# Patient Record
Sex: FEMALE | Race: WHITE | NOT HISPANIC OR LATINO | Employment: OTHER | URBAN - METROPOLITAN AREA
[De-identification: names, ages, dates, MRNs, and addresses within clinical notes are randomized per-mention and may not be internally consistent; named-entity substitution may affect disease eponyms.]

---

## 2023-10-24 ENCOUNTER — TELEPHONE (OUTPATIENT)
Dept: PRIMARY CARE | Facility: CLINIC | Age: 58
End: 2023-10-24
Payer: MEDICAID

## 2023-10-24 DIAGNOSIS — E11.9 TYPE 2 DIABETES MELLITUS WITHOUT COMPLICATION, WITHOUT LONG-TERM CURRENT USE OF INSULIN (MULTI): Primary | ICD-10-CM

## 2023-10-24 NOTE — TELEPHONE ENCOUNTER
Patient would like to know if you can give her a call regarding her Trulicity, she has a few questions.

## 2023-10-28 DIAGNOSIS — R12 HEARTBURN: Primary | ICD-10-CM

## 2023-10-28 RX ORDER — OMEPRAZOLE 20 MG/1
20 CAPSULE, DELAYED RELEASE ORAL DAILY
Qty: 30 CAPSULE | Refills: 1 | Status: SHIPPED | OUTPATIENT
Start: 2023-10-28 | End: 2024-02-26

## 2023-10-28 RX ORDER — OMEPRAZOLE 20 MG/1
20 CAPSULE, DELAYED RELEASE ORAL DAILY
COMMUNITY
Start: 2023-08-31 | End: 2023-10-28 | Stop reason: SDUPTHER

## 2023-11-21 ENCOUNTER — OFFICE VISIT (OUTPATIENT)
Dept: PRIMARY CARE | Facility: CLINIC | Age: 58
End: 2023-11-21
Payer: MEDICAID

## 2023-11-21 VITALS
BODY MASS INDEX: 32.53 KG/M2 | OXYGEN SATURATION: 96 % | TEMPERATURE: 97.1 F | HEART RATE: 80 BPM | SYSTOLIC BLOOD PRESSURE: 106 MMHG | DIASTOLIC BLOOD PRESSURE: 75 MMHG | WEIGHT: 189.5 LBS

## 2023-11-21 DIAGNOSIS — E03.9 HYPOTHYROIDISM, UNSPECIFIED TYPE: ICD-10-CM

## 2023-11-21 DIAGNOSIS — E78.2 MIXED HYPERLIPIDEMIA: ICD-10-CM

## 2023-11-21 DIAGNOSIS — E11.9 TYPE 2 DIABETES MELLITUS WITHOUT COMPLICATION, WITHOUT LONG-TERM CURRENT USE OF INSULIN (MULTI): Primary | ICD-10-CM

## 2023-11-21 PROBLEM — E78.5 HYPERLIPIDEMIA: Status: ACTIVE | Noted: 2023-11-21

## 2023-11-21 PROBLEM — I11.9 HYPERTENSIVE CARDIOVASCULAR DISEASE: Status: ACTIVE | Noted: 2023-11-21

## 2023-11-21 LAB — POC HEMOGLOBIN A1C: 7.5 % (ref 4.2–6.5)

## 2023-11-21 PROCEDURE — 83036 HEMOGLOBIN GLYCOSYLATED A1C: CPT | Performed by: INTERNAL MEDICINE

## 2023-11-21 PROCEDURE — 4010F ACE/ARB THERAPY RXD/TAKEN: CPT | Performed by: INTERNAL MEDICINE

## 2023-11-21 PROCEDURE — 1036F TOBACCO NON-USER: CPT | Performed by: INTERNAL MEDICINE

## 2023-11-21 PROCEDURE — 3074F SYST BP LT 130 MM HG: CPT | Performed by: INTERNAL MEDICINE

## 2023-11-21 PROCEDURE — 3008F BODY MASS INDEX DOCD: CPT | Performed by: INTERNAL MEDICINE

## 2023-11-21 PROCEDURE — 3078F DIAST BP <80 MM HG: CPT | Performed by: INTERNAL MEDICINE

## 2023-11-21 PROCEDURE — 99213 OFFICE O/P EST LOW 20 MIN: CPT | Performed by: INTERNAL MEDICINE

## 2023-11-21 RX ORDER — LEVOTHYROXINE SODIUM 100 UG/1
1 TABLET ORAL DAILY
COMMUNITY
Start: 2020-07-01

## 2023-11-21 RX ORDER — METFORMIN HYDROCHLORIDE 500 MG/1
500 TABLET, EXTENDED RELEASE ORAL 2 TIMES DAILY
COMMUNITY
End: 2024-02-20 | Stop reason: SDUPTHER

## 2023-11-21 RX ORDER — ROSUVASTATIN CALCIUM 40 MG/1
40 TABLET, COATED ORAL DAILY
COMMUNITY

## 2023-11-21 RX ORDER — LANCETS 30 GAUGE
EACH MISCELLANEOUS
COMMUNITY
Start: 2023-04-27 | End: 2024-06-06 | Stop reason: CLARIF

## 2023-11-21 RX ORDER — FENOFIBRATE 145 MG/1
145 TABLET, FILM COATED ORAL DAILY
COMMUNITY

## 2023-11-21 RX ORDER — METOPROLOL SUCCINATE 25 MG/1
25 TABLET, EXTENDED RELEASE ORAL DAILY
COMMUNITY

## 2023-11-21 RX ORDER — KETOROLAC TROMETHAMINE 10 MG/1
TABLET, FILM COATED ORAL
COMMUNITY
End: 2023-11-21 | Stop reason: ALTCHOICE

## 2023-11-21 RX ORDER — CLOTRIMAZOLE AND BETAMETHASONE DIPROPIONATE 10; .64 MG/G; MG/G
CREAM TOPICAL 2 TIMES DAILY
COMMUNITY
Start: 2011-11-16

## 2023-11-21 RX ORDER — LANCETS 33 GAUGE
EACH MISCELLANEOUS
COMMUNITY
Start: 2023-04-21 | End: 2024-01-24

## 2023-11-21 RX ORDER — LOSARTAN POTASSIUM 50 MG/1
50 TABLET ORAL DAILY
COMMUNITY

## 2023-11-21 RX ORDER — BLOOD-GLUCOSE METER
EACH MISCELLANEOUS
COMMUNITY
End: 2024-06-05 | Stop reason: SDUPTHER

## 2023-11-21 RX ORDER — TRIAMCINOLONE ACETONIDE 1 MG/G
CREAM TOPICAL 2 TIMES DAILY
COMMUNITY

## 2023-11-21 ASSESSMENT — PATIENT HEALTH QUESTIONNAIRE - PHQ9
1. LITTLE INTEREST OR PLEASURE IN DOING THINGS: NOT AT ALL
SUM OF ALL RESPONSES TO PHQ9 QUESTIONS 1 AND 2: 0
2. FEELING DOWN, DEPRESSED OR HOPELESS: NOT AT ALL

## 2023-11-21 NOTE — PROGRESS NOTES
Subjective   Patient ID: Binta Mccann is a 57 y.o. female who presents for Follow-up.    HPI   Recently had cool sculpting done and has complication and probably has to go for liposuction    Patient is compliant with medications. Denies hypoglycemic symptoms. Denies side effects from medications. Watching sugar and carbohydrate intake and salt intake. Denies visual disturbance, numbness or tingling. Denies excess thirst or urination. Denies recurrent infections. Does feet checks regularly.  Has not had annual eye exam  bs high in am and after meals.          back pain is better after inj,still bothersome some days     on thyroxine.no constipation     Patient is taking medication as prescribed. Watches cholesterol and fat intake and sugar intake. Has been exercising regularly. Denies exertional chest pain or shortness of breath. Denies headache or dizziness.      seeing cardiologist regularly.     on steroid cream for eczema     recent creatinine stable.     Takes ppi prn.  Review of Systems  GENERAL;:Denies ,fatigue,fever,chills or sweats.gained wt  HEENT:Denies headache,sorethroat,sinus drainage ,vision or hearing issues  CVS:No chest pain,sob or palpitation.No leg swelling  RESP:No c/c cough,cp,sob or wheezing  GI:NO abdominal pain,nausea,heartburn,vomiting,or bowel changes.  :No painful urination,frequency or hematuria.No incontinence  MSK:No joint pain or swelling.has back pain  NEURO:No dizziness,weakness,numbness or tingling.No memory issues  PSYCH:No anxiety,depression or sleep issues       Family History  Mother    · Family history of cardiac disorder (V17.49) (Z82.49)   · all were smokers   · Family history of sudden death (V19.8) (Z84.89)  Father    · Family history of ESRD (end stage renal disease) on dialysis   · Family history of cardiac disorder (V17.49) (Z82.49)   · all were smokers   · Family history of hypertension (V17.49) (Z82.49)   · Family history of myocardial infarction (V17.3)  (Z82.49)  Sister    · Family history of cardiac disorder (V17.49) (Z82.49)   · all were smokers   · Family history of diabetes mellitus (V18.0) (Z83.3)   · Family history of hypertension (V17.49) (Z82.49)     Social History  Problems    · Daily caffeine consumption   ·    · in contact with ex-, no children, works at [employer], lives alone   · Former smoker (V15.82) (Z87.891)   · 1.5 ppd x 30 yr., quit in 2009   · Moderate alcohol use   · 0-4 drinks per wk.   · Occasional alcohol use  Objective   /75   Pulse 80   Temp 36.2 °C (97.1 °F)   Wt 86 kg (189 lb 8 oz)   SpO2 96%   BMI 32.53 kg/m²     Physical Exam  HEENT: No pallor or icterus.  Neck: Supple. No goiter  CVS: S1, S2, regular in rate and rhythm. No peripheral edema.  Chest: Clear to auscultation bilateral. Good air entry.  Extremities no edema or tenderness.  Neuro: Grossly nonfocal, alert and oriented.  Psych: Mood and affect normal, good judgment and insight     Assessment/Plan   Problem List Items Addressed This Visit             ICD-10-CM    Diabetes mellitus type 2, uncomplicated (CMS/HCC) - Primary E11.9    Relevant Orders    POCT glycosylated hemoglobin (Hb A1C) manually resulted    Hyperlipidemia E78.5    Hypothyroidism E03.9        Diabetes control is not ideal.  Discussed diet changes in detail.  Information given  No medication changes today  She will continue to monitor  Recent labs reviewed from chart.  Other conditions are stable

## 2023-12-21 ENCOUNTER — OFFICE VISIT (OUTPATIENT)
Dept: PRIMARY CARE | Facility: CLINIC | Age: 58
End: 2023-12-21
Payer: MEDICAID

## 2023-12-21 VITALS
DIASTOLIC BLOOD PRESSURE: 80 MMHG | WEIGHT: 189.8 LBS | SYSTOLIC BLOOD PRESSURE: 114 MMHG | BODY MASS INDEX: 32.4 KG/M2 | OXYGEN SATURATION: 94 % | HEIGHT: 64 IN | TEMPERATURE: 96.2 F | HEART RATE: 85 BPM

## 2023-12-21 DIAGNOSIS — J40 BRONCHITIS: Primary | ICD-10-CM

## 2023-12-21 PROCEDURE — 3079F DIAST BP 80-89 MM HG: CPT | Performed by: INTERNAL MEDICINE

## 2023-12-21 PROCEDURE — 4010F ACE/ARB THERAPY RXD/TAKEN: CPT | Performed by: INTERNAL MEDICINE

## 2023-12-21 PROCEDURE — 1036F TOBACCO NON-USER: CPT | Performed by: INTERNAL MEDICINE

## 2023-12-21 PROCEDURE — 99213 OFFICE O/P EST LOW 20 MIN: CPT | Performed by: INTERNAL MEDICINE

## 2023-12-21 PROCEDURE — 3008F BODY MASS INDEX DOCD: CPT | Performed by: INTERNAL MEDICINE

## 2023-12-21 PROCEDURE — 3074F SYST BP LT 130 MM HG: CPT | Performed by: INTERNAL MEDICINE

## 2023-12-21 RX ORDER — AZITHROMYCIN 250 MG/1
TABLET, FILM COATED ORAL
Qty: 6 TABLET | Refills: 0 | Status: SHIPPED | OUTPATIENT
Start: 2023-12-21 | End: 2023-12-28 | Stop reason: ALTCHOICE

## 2023-12-21 RX ORDER — CODEINE PHOSPHATE AND GUAIFENESIN 10; 100 MG/5ML; MG/5ML
5 SOLUTION ORAL EVERY 8 HOURS PRN
Qty: 75 ML | Refills: 0 | Status: SHIPPED | OUTPATIENT
Start: 2023-12-21 | End: 2023-12-28 | Stop reason: SDUPTHER

## 2023-12-21 RX ORDER — ALBUTEROL SULFATE 90 UG/1
2 AEROSOL, METERED RESPIRATORY (INHALATION) EVERY 4 HOURS PRN
Qty: 8.5 G | Refills: 0 | Status: SHIPPED | OUTPATIENT
Start: 2023-12-21 | End: 2024-02-20 | Stop reason: ALTCHOICE

## 2023-12-21 RX ORDER — PREGABALIN 100 MG/1
100 CAPSULE ORAL 3 TIMES DAILY
COMMUNITY
Start: 2022-12-07 | End: 2024-05-20

## 2023-12-21 ASSESSMENT — ENCOUNTER SYMPTOMS
HEADACHES: 0
TROUBLE SWALLOWING: 0
SINUS PAIN: 0
DIFFICULTY URINATING: 0
SORE THROAT: 0
DIZZINESS: 0

## 2023-12-21 ASSESSMENT — PATIENT HEALTH QUESTIONNAIRE - PHQ9
1. LITTLE INTEREST OR PLEASURE IN DOING THINGS: NOT AT ALL
2. FEELING DOWN, DEPRESSED OR HOPELESS: NOT AT ALL
SUM OF ALL RESPONSES TO PHQ9 QUESTIONS 1 AND 2: 0

## 2023-12-21 NOTE — PROGRESS NOTES
"Subjective   Patient ID: Binta Mccann is a 58 y.o. female who presents for Follow-up (Pt believes she is having a bronchial flare up. Has had bronchitis in the past. Pt states she has had flare ups, but last one was 8 years ago. Using OTC and it goes away but comes right back. Cough has been rough for her. ).    HPI   Started coughing about 2 weeks ago.no cold symptoms  Productive  No blood  Not n getting better  Has some sob  No cp  No n/v/d  No heartburn  No travel  Review of Systems   HENT:  Negative for congestion, dental problem, nosebleeds, postnasal drip, sinus pain, sneezing, sore throat and trouble swallowing.    Genitourinary:  Negative for difficulty urinating.   Neurological:  Negative for dizziness, syncope and headaches.       Objective   /80   Pulse 85   Temp 35.7 °C (96.2 °F)   Ht 1.626 m (5' 4\")   Wt 86.1 kg (189 lb 12.8 oz)   SpO2 94%   BMI 32.58 kg/m²     Physical Exam  Not in acute distress  Coughing periodically  No pallor or icterus  No sinus tenderness or throat erythema  Neck without lymphadenopathy or JVD  Chest clear  CVS: S1-S2 regular not tachycardic no murmur no calf edema  Assessment/Plan   Problem List Items Addressed This Visit             ICD-10-CM    Bronchitis - Primary J40    Relevant Medications    azithromycin (Zithromax) 250 mg tablet    albuterol (ProAir HFA) 90 mcg/actuation inhaler    codeine-guaifenesin (Robitussin-AC)  mg/5 mL syrup     Likely bronchitis.  Will give Zithromax and inhaler and cough syrup.  Need a chest x-ray if there is no rapid improvement  Go to ER for any chest pain or worsening shortness of breath.  Follow-up if symptoms persist.     "

## 2023-12-28 ENCOUNTER — ANCILLARY PROCEDURE (OUTPATIENT)
Dept: RADIOLOGY | Facility: CLINIC | Age: 58
End: 2023-12-28
Payer: MEDICAID

## 2023-12-28 ENCOUNTER — OFFICE VISIT (OUTPATIENT)
Dept: PRIMARY CARE | Facility: CLINIC | Age: 58
End: 2023-12-28
Payer: MEDICAID

## 2023-12-28 VITALS
BODY MASS INDEX: 31.34 KG/M2 | SYSTOLIC BLOOD PRESSURE: 126 MMHG | HEART RATE: 83 BPM | WEIGHT: 182.6 LBS | DIASTOLIC BLOOD PRESSURE: 80 MMHG | OXYGEN SATURATION: 95 % | TEMPERATURE: 97.2 F

## 2023-12-28 DIAGNOSIS — J40 BRONCHITIS: Primary | ICD-10-CM

## 2023-12-28 DIAGNOSIS — J40 BRONCHITIS: ICD-10-CM

## 2023-12-28 PROCEDURE — 4010F ACE/ARB THERAPY RXD/TAKEN: CPT | Performed by: FAMILY MEDICINE

## 2023-12-28 PROCEDURE — 99213 OFFICE O/P EST LOW 20 MIN: CPT | Performed by: FAMILY MEDICINE

## 2023-12-28 PROCEDURE — 1036F TOBACCO NON-USER: CPT | Performed by: FAMILY MEDICINE

## 2023-12-28 PROCEDURE — 3008F BODY MASS INDEX DOCD: CPT | Performed by: FAMILY MEDICINE

## 2023-12-28 PROCEDURE — 71046 X-RAY EXAM CHEST 2 VIEWS: CPT | Performed by: RADIOLOGY

## 2023-12-28 PROCEDURE — 3074F SYST BP LT 130 MM HG: CPT | Performed by: FAMILY MEDICINE

## 2023-12-28 PROCEDURE — 3079F DIAST BP 80-89 MM HG: CPT | Performed by: FAMILY MEDICINE

## 2023-12-28 PROCEDURE — 71046 X-RAY EXAM CHEST 2 VIEWS: CPT

## 2023-12-28 RX ORDER — CODEINE PHOSPHATE AND GUAIFENESIN 10; 100 MG/5ML; MG/5ML
5 SOLUTION ORAL EVERY 8 HOURS PRN
Qty: 75 ML | Refills: 0 | Status: SHIPPED | OUTPATIENT
Start: 2023-12-28 | End: 2024-01-04

## 2023-12-28 RX ORDER — PREDNISONE 10 MG/1
10 TABLET ORAL 2 TIMES DAILY
Qty: 20 TABLET | Refills: 8 | Status: SHIPPED | OUTPATIENT
Start: 2023-12-28 | End: 2024-05-20 | Stop reason: ALTCHOICE

## 2023-12-28 RX ORDER — BUDESONIDE AND FORMOTEROL FUMARATE DIHYDRATE 160; 4.5 UG/1; UG/1
2 AEROSOL RESPIRATORY (INHALATION)
Qty: 1 EACH | Refills: 11 | Status: SHIPPED | OUTPATIENT
Start: 2023-12-28 | End: 2024-02-20 | Stop reason: ALTCHOICE

## 2023-12-28 ASSESSMENT — ENCOUNTER SYMPTOMS: COUGH: 1

## 2023-12-28 NOTE — PROGRESS NOTES
Subjective   Patient ID: Binta Mccann is a 58 y.o. female who presents for Cough.  Cough  Associated symptoms include postnasal drip. Pertinent negatives include no rhinorrhea or sore throat.   Patient continues to have a nonproductive cough kind of a tight cough.  Patient denies any wheezing denies any fever or chills states she feels better but she is not 100%.  Patient denies any shortness of breath.    Patient sounds like she is having bronchospasm after listening to her lungs and gets sounds like she has some end expiratory wheezing.  Review of Systems   HENT:  Positive for congestion and postnasal drip. Negative for nosebleeds, rhinorrhea, sinus pressure, sinus pain and sore throat.    Respiratory:  Positive for cough.    Cardiovascular: Negative.    Gastrointestinal: Negative.        Objective   Physical Exam  General no acute process no icterus well-hydrated alert active oriented    HEENT normocephalic no palpable tenderness eyes pupils equal reactive light and accommodation extraocular muscles intact no icterus and/or erythema ears benign external auditory canal no gross deformities nose no discharge drainage erythema bleeding throat no erythema.    Heart regular rate and rhythm without S3-S4 or murmur    Lungs lungs sound to me like she has some end expiratory wheezing    Abdomen soft nontender nondistended no palpable masses no organomegaly splenomegaly.    Integument no rash no lumps bumps or concerning lesions.    Neurologic no tics tremors or seizures no decreased range of motion or ataxia.    Musculoskeletal good range of motion no gross abnormalities noted  Assessment/Plan        After reviewing the chest x-ray I do not see any evidence of consolidation but will wait for the radiologist interpretation    Sami Ugalde DO 12/28/23 10:49 AM

## 2023-12-29 ASSESSMENT — ENCOUNTER SYMPTOMS
SINUS PAIN: 0
SORE THROAT: 0
GASTROINTESTINAL NEGATIVE: 1
RHINORRHEA: 0
SINUS PRESSURE: 0
CARDIOVASCULAR NEGATIVE: 1

## 2024-01-24 DIAGNOSIS — E11.9 TYPE 2 DIABETES MELLITUS WITHOUT COMPLICATION, WITHOUT LONG-TERM CURRENT USE OF INSULIN (MULTI): Primary | ICD-10-CM

## 2024-02-20 ENCOUNTER — OFFICE VISIT (OUTPATIENT)
Dept: PRIMARY CARE | Facility: CLINIC | Age: 59
End: 2024-02-20
Payer: MEDICAID

## 2024-02-20 VITALS
TEMPERATURE: 97.6 F | WEIGHT: 184.6 LBS | HEIGHT: 66 IN | BODY MASS INDEX: 29.67 KG/M2 | OXYGEN SATURATION: 98 % | SYSTOLIC BLOOD PRESSURE: 125 MMHG | HEART RATE: 83 BPM | RESPIRATION RATE: 16 BRPM | DIASTOLIC BLOOD PRESSURE: 80 MMHG

## 2024-02-20 DIAGNOSIS — E11.9 TYPE 2 DIABETES MELLITUS WITHOUT COMPLICATION, WITHOUT LONG-TERM CURRENT USE OF INSULIN (MULTI): Primary | ICD-10-CM

## 2024-02-20 DIAGNOSIS — E03.9 HYPOTHYROIDISM, UNSPECIFIED TYPE: ICD-10-CM

## 2024-02-20 DIAGNOSIS — Z12.31 ENCOUNTER FOR SCREENING MAMMOGRAM FOR BREAST CANCER: ICD-10-CM

## 2024-02-20 DIAGNOSIS — L30.4 INTERTRIGO: ICD-10-CM

## 2024-02-20 PROBLEM — J40 BRONCHITIS: Status: RESOLVED | Noted: 2023-12-21 | Resolved: 2024-02-20

## 2024-02-20 LAB — POC HEMOGLOBIN A1C: 9.2 % (ref 4.2–6.5)

## 2024-02-20 PROCEDURE — 3074F SYST BP LT 130 MM HG: CPT | Performed by: INTERNAL MEDICINE

## 2024-02-20 PROCEDURE — 83036 HEMOGLOBIN GLYCOSYLATED A1C: CPT | Performed by: INTERNAL MEDICINE

## 2024-02-20 PROCEDURE — 4010F ACE/ARB THERAPY RXD/TAKEN: CPT | Performed by: INTERNAL MEDICINE

## 2024-02-20 PROCEDURE — 99214 OFFICE O/P EST MOD 30 MIN: CPT | Performed by: INTERNAL MEDICINE

## 2024-02-20 PROCEDURE — 3008F BODY MASS INDEX DOCD: CPT | Performed by: INTERNAL MEDICINE

## 2024-02-20 PROCEDURE — 3079F DIAST BP 80-89 MM HG: CPT | Performed by: INTERNAL MEDICINE

## 2024-02-20 PROCEDURE — 1036F TOBACCO NON-USER: CPT | Performed by: INTERNAL MEDICINE

## 2024-02-20 RX ORDER — METFORMIN HYDROCHLORIDE 500 MG/1
500 TABLET, EXTENDED RELEASE ORAL 2 TIMES DAILY
Qty: 60 TABLET | Refills: 11 | Status: SHIPPED | OUTPATIENT
Start: 2024-02-20 | End: 2024-04-19 | Stop reason: DRUGHIGH

## 2024-02-20 RX ORDER — NYSTATIN 100000 [USP'U]/G
1 POWDER TOPICAL 2 TIMES DAILY
Qty: 30 G | Refills: 1 | Status: SHIPPED | OUTPATIENT
Start: 2024-02-20 | End: 2024-03-21

## 2024-02-20 ASSESSMENT — COLUMBIA-SUICIDE SEVERITY RATING SCALE - C-SSRS: 1. IN THE PAST MONTH, HAVE YOU WISHED YOU WERE DEAD OR WISHED YOU COULD GO TO SLEEP AND NOT WAKE UP?: NO

## 2024-02-20 ASSESSMENT — PAIN SCALES - GENERAL: PAINLEVEL: 0-NO PAIN

## 2024-02-20 NOTE — PROGRESS NOTES
"Subjective   Patient ID: Binta Mccann is a 58 y.o. female who presents for Follow-up and Annual Exam.    HPI     Has a rash between breasts.  Has been wearing and tight undercoating due to her cool sculpting  Felt a raised area over sternum  No breast lump or discharge  Has not had a mammogram for a while since it is painful  Patient is compliant with medications. Denies hypoglycemic symptoms. Denies side effects from medications. Stopped jardiance.Watching sugar and carbohydrate intake and salt intake. Denies visual disturbance, numbness or tingling. Denies excess thirst or urination. Denies recurrent infections. Does feet checks regularly.  Has not had annual eye exam  Fasting is still high,sometimes over 200.  Diet involves chicken and vegetables and starches.  Denies eating sugary foods   cough resolved     back pain is better after inj,still bothersome some days     on thyroxine.no constipation     Patient is taking medication as prescribed. Watches cholesterol and fat intake and sugar intake. Has been exercising regularly. Denies exertional chest pain or shortness of breath. Denies headache or dizziness.      seeing cardiologist regularly.     on steroid cream for eczema     recent creatinine stable.  Review of Systems  GENERAL;:Denies ,fatigue,fever,chills or sweats.gained wt  HEENT:Denies headache,sorethroat,sinus drainage ,vision or hearing issues  CVS:No chest pain,sob or palpitation.No leg swelling  RESP:No c/c cough,cp,sob or wheezing  GI:NO abdominal pain,nausea,heartburn,vomiting,or bowel changes.  :No painful urination,frequency or hematuria.No incontinence  MSK:No joint pain or swelling.has back pain  NEURO:No dizziness,weakness,numbness or tingling.No memory issues  PSYCH:No anxiety,depression or sleep issues      Objective   /80   Pulse 83   Temp 36.4 °C (97.6 °F) (Temporal)   Resp 16   Ht 1.676 m (5' 6\")   Wt 83.7 kg (184 lb 9.6 oz)   SpO2 98%   BMI 29.80 kg/m²     Physical " Exam  HEENT: No pallor or icterus.  Neck: Supple. No goiter  CVS: S1, S2, regular in rate and rhythm. No peripheral edema.  Chest: Clear to auscultation bilateral. Good air entry.  Extremities no edema or tenderness.  Neuro: Grossly nonfocal, alert and oriented.  Psych: Mood and affect normal, good judgment and insight  Has erythematous scaly rash between breasts.  No lumps felt     Assessment/Plan   Problem List Items Addressed This Visit             ICD-10-CM    Diabetes mellitus type 2, uncomplicated (CMS/HCC) - Primary E11.9    Relevant Medications    semaglutide (Rybelsus) 14 mg tablet tablet    Other Relevant Orders    POCT glycosylated hemoglobin (Hb A1C) manually resulted    Referral to Clinical Pharmacy    Hypothyroidism E03.9     Other Visit Diagnoses         Codes    Encounter for screening mammogram for breast cancer     Z12.31    Relevant Orders    BI mammo bilateral screening tomosynthesis    Intertrigo     L30.4    Relevant Medications    nystatin (Mycostatin) 100,000 unit/gram powder          Diabetes control has worsened.  She recently had prolonged upper respiratory infection and also took steroids  Could not tolerate Trulicity or Jardiance.has tried januvia,.ozempic was not in plan  Will refer to clinical pharmacy  Increase Rybelsus to 14 mg  Diet changes and importance of portion control discussed.  Continue metformin.    Nystatin given for intertrigo.  Recommended to do a mammogram.  Order given  She declines lung cancer screening and also Pap and pelvic  Cardiac status stable and she follows with cardiology.  Blood pressure is controlled  Eye exam is due and recommended to schedule   Follow-up in 3 months and as needed

## 2024-02-21 ENCOUNTER — SPECIALTY PHARMACY (OUTPATIENT)
Dept: PHARMACY | Facility: CLINIC | Age: 59
End: 2024-02-21

## 2024-02-22 ENCOUNTER — TELEPHONE (OUTPATIENT)
Dept: PRIMARY CARE | Facility: CLINIC | Age: 59
End: 2024-02-22
Payer: MEDICAID

## 2024-02-22 ENCOUNTER — HOSPITAL ENCOUNTER (OUTPATIENT)
Dept: RADIOLOGY | Facility: CLINIC | Age: 59
Discharge: HOME | End: 2024-02-22
Payer: MEDICAID

## 2024-02-22 VITALS — BODY MASS INDEX: 29.57 KG/M2 | WEIGHT: 184 LBS | HEIGHT: 66 IN

## 2024-02-22 DIAGNOSIS — Z12.31 ENCOUNTER FOR SCREENING MAMMOGRAM FOR BREAST CANCER: ICD-10-CM

## 2024-02-22 PROCEDURE — 77067 SCR MAMMO BI INCL CAD: CPT

## 2024-02-22 PROCEDURE — 77067 SCR MAMMO BI INCL CAD: CPT | Performed by: STUDENT IN AN ORGANIZED HEALTH CARE EDUCATION/TRAINING PROGRAM

## 2024-02-22 PROCEDURE — 77063 BREAST TOMOSYNTHESIS BI: CPT | Performed by: STUDENT IN AN ORGANIZED HEALTH CARE EDUCATION/TRAINING PROGRAM

## 2024-02-22 NOTE — TELEPHONE ENCOUNTER
Patient wants to know if she should fill the semaglutide 7 mg if she runs out before the prior auth for the 14 mg goes through. Please advise.

## 2024-02-27 ENCOUNTER — TELEMEDICINE (OUTPATIENT)
Dept: PHARMACY | Facility: HOSPITAL | Age: 59
End: 2024-02-27
Payer: MEDICAID

## 2024-02-27 DIAGNOSIS — R12 HEARTBURN: ICD-10-CM

## 2024-02-27 DIAGNOSIS — E11.9 TYPE 2 DIABETES MELLITUS WITHOUT COMPLICATION, WITHOUT LONG-TERM CURRENT USE OF INSULIN (MULTI): ICD-10-CM

## 2024-02-27 RX ORDER — OMEPRAZOLE 20 MG/1
20 CAPSULE, DELAYED RELEASE ORAL DAILY
Qty: 30 CAPSULE | Refills: 1 | Status: SHIPPED | OUTPATIENT
Start: 2024-02-27 | End: 2024-05-20 | Stop reason: SDUPTHER

## 2024-02-27 NOTE — PROGRESS NOTES
"Pharmacist Clinic: Diabetes   Initial Visit  Binta Mccann is a 58 y.o. female was referred to Clinical Pharmacy Team for diabetes management.     Referring Provider: Misti Contreras MD    Subjective   HPI  Known diabetes complications include  none   Endocrinology provider? No  Special needs/barriers to therapy: PA for Rybelsus    Diabetes Pharmacotherapy   Rybelsus 14 mg 1 tablet by mouth once a day -- Recently increased to 14 mg on 2/20 but has not started taking; has been taking 2 7 mg tablets since 2/20/24.  Metformin  mg 1 tablet by mouth 2 times a day    Previous Diabetes Pharmacotherapy    Trulicity - nausea/vomiting   Jardiance - dehydrated all the time  Januvia - not bringing down her A1c       Adverse Effects: Patient denies any GI adverse effects (Upset stomach/diarrhea/constipation/nausea/vomiting)  Adherence: Takes medication as directed and reports no missed doses  Affordability/Accessibility  Rybelsus 14 mg requires a PA through patient's insurance.     Risk Reducing Meds  On ACEi/ARB: Yes   On Statin: Yes   On SGLT2i: No   On GLP1-RA: Yes     Glucose Monitoring:  Patient is using glucometer; does not test regularly     Current BG Measurements:  FBG   190     Hypoglycemia? Patient denies signs and symptoms and No BG readings < 80 mg/dL  Hyperglycemia? Denies signs and symptoms; Highest reading recently 250 mg/dL      Diabetic Eye Exam: Up to date, completed 2023    Foot Exam:  checks feet daily      Diet: in general, a \"healthy\" diet  , well balanced. Eats lots of meat and limited amount of vegetables    Physical activity: no regular exercise      Pertinent PMH Review for GLP1-ra and/or SGLT2i:  PMH of Pancreatitis: No  PMH of Retinopathy: No  PMH of Recurrent Urinary Tract Infections: No  PMH of Medullary Thyroid Cancer (MTC): No  PMH of Multiple Endocrine Neoplasia (MEN) Type II: No    Objective   Lab Review  No results found for: \"BILITOT\", \"CALCIUM\", \"CO2\", \"CL\", \"CREATININE\", " "\"GLUCOSE\", \"ALKPHOS\", \"K\", \"PROT\", \"NA\", \"AST\", \"ALT\", \"BUN\", \"ANIONGAP\", \"MG\", \"PHOS\", \"GGT\", \"LDH\", \"ALBUMIN\", \"AMYLASE\", \"LIPASE\", \"GFRF\", \"GFRMALE\"  No results found for: \"TRIG\", \"CHOL\", \"LDLCALC\", \"HDL\"  POC HEMOGLOBIN A1c (%)   Date Value   02/20/2024 9.2 (A)   11/21/2023 7.5 (A)     Hemoglobin A1C (%)   Date Value   08/15/2022 6.0 (A)     There is no height or weight on file to calculate BMI.  Wt Readings from Last 3 Encounters:   02/22/24 83.5 kg (184 lb)   02/20/24 83.7 kg (184 lb 9.6 oz)   12/28/23 82.8 kg (182 lb 9.6 oz)      Urine Albumin: n/a  The ASCVD Risk score (Teofilo DK, et al., 2019) failed to calculate for the following reasons:    Cannot find a previous HDL lab    Cannot find a previous total cholesterol lab    Immunizations:  Influenza Vaccine: 2022  Pneumonia Vaccine: not documented    Drug Interactions  No significant drug-drug interactions exist that require change in therapy.    Assessment/Plan   Problem List Items Addressed This Visit          Endocrine/Metabolic    Diabetes mellitus type 2, uncomplicated (CMS/HCC)     Patients diabetes is poorly controlled with most recent A1c of 9.2% (goal < 7 %). Patient's SMBG are not at goal. Patient has been tolerating Rybelsus 7 mg and was recently titrated up to 14 mg on 2/20/24 by Dr. Contreras for additional glycemic control. Has not started taking yet d/t requiring a PA. Patient previously on chronic steroids which has been since discontinued.     Encouraged patient to begin closely monitoring their blood sugars at least once daily and then PRN hypoglycemia.     Of note, patient has been taking 2 tablets of Rybelsus 7 mg which is per the package insert not recommended since it has not been studied. Will have patient continue taking 2 tabs until PA approved.     Patient will be due for their next A1c anytime after 5/20/24.     Compliance at present is estimated to be good. Efforts to improve compliance (if necessary) will be directed at dietary " modifications: eating meats in moderation while increasing vegetables and fruits.  and increased exercise.    Labs ordered: none       Prior authorization has been submitted on behalf of this patient for Rybelsus 14 mg on 02/27/24 . Awaiting determination for status of PA request.   Key: [id number]    Patient will be contacted upon response of patient's prior authorization.        PLAN  Start Rybelsus 14 mg once daily  Stop Rybelsus 7 mg   Continue Metformin 500 mg twice daily   Follow up: 1 month by telephone. Will reach out when PA approved and have Rybelsus and metformin delivered to patient home.     Nadya Beal, PharmD    Continue all meds under the continuation of care with the referring provider and clinical pharmacy team.

## 2024-02-28 ENCOUNTER — TELEPHONE (OUTPATIENT)
Dept: PHARMACY | Facility: HOSPITAL | Age: 59
End: 2024-02-28
Payer: MEDICAID

## 2024-02-28 PROCEDURE — RXMED WILLOW AMBULATORY MEDICATION CHARGE

## 2024-02-28 NOTE — TELEPHONE ENCOUNTER
Prior Authorization - Rybelsus  A prior authorization request for Binta Mccann was submitted for Rybelsus 14 mg on 2/27/24.  The authorization request was approved by the patient's insurance on 02/28/24.    Key: [id number]     The patient has been notified of this outcome.  Please contact clinical pharmacy with any further questions. Thank you.    Nadya Beal, PharmD  02/28/24

## 2024-02-29 ENCOUNTER — PHARMACY VISIT (OUTPATIENT)
Dept: PHARMACY | Facility: CLINIC | Age: 59
End: 2024-02-29
Payer: MEDICAID

## 2024-03-26 ENCOUNTER — TELEMEDICINE (OUTPATIENT)
Dept: PHARMACY | Facility: HOSPITAL | Age: 59
End: 2024-03-26
Payer: MEDICARE

## 2024-03-26 DIAGNOSIS — E11.9 TYPE 2 DIABETES MELLITUS WITHOUT COMPLICATION, WITHOUT LONG-TERM CURRENT USE OF INSULIN (MULTI): Primary | ICD-10-CM

## 2024-03-26 PROCEDURE — RXMED WILLOW AMBULATORY MEDICATION CHARGE

## 2024-03-26 NOTE — PROGRESS NOTES
"Pharmacist Clinic: Diabetes   Follow Up Visit  Binta Mccann is a 58 y.o. female was referred to Clinical Pharmacy Team for diabetes management.     Referring Provider: Misti Contreras MD    Subjective   HPI  Known diabetes complications include  none   Endocrinology provider? No  Special needs/barriers to therapy: PA for Rybelsus (Approved 2/27/24)    Diabetes Pharmacotherapy   Rybelsus 14 mg 1 tablet by mouth once a day -- last filled 2/29/24  Metformin  mg 1 tablet by mouth 2 times a day    Previous Diabetes Pharmacotherapy    Trulicity - nausea/vomiting   Jardiance - dehydrated all the time  Januvia - not bringing down her A1c       Adverse Effects: Patient admits to GI adverse effects including: consistent nausea since titrating to Rybelsus 14 mg   Adherence: Takes medication as directed and reports no missed doses  Affordability/Accessibility: Rybelsus 14 mg PA approved 2/27    Risk Reducing Meds  On ACEi/ARB: Yes   On Statin: Yes   On SGLT2i: No   On GLP1-RA: Yes       Glucose Monitoring:  Patient is using glucometer; does not test regularly     Current BG Measurements:  Since 2/28/24:  FBG   144   164  178  167  153  132  161  131  153  161  148  136  136  148  172   None above 172 mg/dL  Lowest 131 mg/dL    Previous BG Measurements:  FBG   190     Hypoglycemia? Patient denies signs and symptoms and No BG readings < 80 mg/dL  Hyperglycemia? Denies signs and symptoms; Highest reading recently 250 mg/dL      Diabetic Eye Exam: Up to date, completed 2023    Foot Exam:  checks feet daily      Diet: in general, a \"healthy\" diet  , well balanced. Eats lots of meat and limited amount of vegetables    Physical activity: no regular exercise; States once the weather becomes warmer, will walk a lot more       Pertinent PMH Review for GLP1-ra and/or SGLT2i:  PMH of Pancreatitis: No  PMH of Retinopathy: No  PMH of Recurrent Urinary Tract Infections: No  PMH of Medullary Thyroid Cancer (MTC): No  PMH of Multiple " "Endocrine Neoplasia (MEN) Type II: No    Objective   Lab Review  No results found for: \"BILITOT\", \"CALCIUM\", \"CO2\", \"CL\", \"CREATININE\", \"GLUCOSE\", \"ALKPHOS\", \"K\", \"PROT\", \"NA\", \"AST\", \"ALT\", \"BUN\", \"ANIONGAP\", \"MG\", \"PHOS\", \"GGT\", \"LDH\", \"ALBUMIN\", \"AMYLASE\", \"LIPASE\", \"GFRF\", \"GFRMALE\"  No results found for: \"TRIG\", \"CHOL\", \"LDLCALC\", \"HDL\"  POC HEMOGLOBIN A1c (%)   Date Value   02/20/2024 9.2 (A)   11/21/2023 7.5 (A)     Hemoglobin A1C (%)   Date Value   08/15/2022 6.0 (A)     There is no height or weight on file to calculate BMI.  Wt Readings from Last 3 Encounters:   02/22/24 83.5 kg (184 lb)   02/20/24 83.7 kg (184 lb 9.6 oz)   12/28/23 82.8 kg (182 lb 9.6 oz)      Urine Albumin: n/a  The ASCVD Risk score (Teofilo SANTOS, et al., 2019) failed to calculate for the following reasons:    Cannot find a previous HDL lab    Cannot find a previous total cholesterol lab    Immunizations:  Influenza Vaccine: 2022  Pneumonia Vaccine: not documented    Drug Interactions  No significant drug-drug interactions exist that require change in therapy.    Assessment/Plan   Problem List Items Addressed This Visit          Endocrine/Metabolic    Diabetes mellitus type 2, uncomplicated (CMS/HCC) - Primary   Patients diabetes is poorly controlled with most recent A1c of 9.2% (goal < 7 %). Patient's SMBG are not at goal. Patient has been experiencing consistent nausea since dose increase of Rybelsus. Discussed eating smaller meals and limiting greasy and deep fried foods to mitigate nausea. Side effects with GLP are typically transient so patient will continue for a little while and nausea will hopefully disappear.     Patient previously on chronic steroids which have been since discontinued.     Patient will be due for their next A1c anytime after 5/20/24.     Compliance at present is estimated to be good. Efforts to improve compliance (if necessary) will be directed at dietary modifications: eating meats in moderation while increasing " vegetables and fruits.  and increased exercise.    Discussed with patient plan if she fails Rybelsus therapy which includes titrating up metformin to max dose and/or adding alternative therapy for glycemic control. Of note, patient has trialed multiple antidiabetics in the past without success. To avoid the addition of more medications, patient states she will begin walking a lot once the weather becomes nicer. Will give patient 6 weeks to adjust to Rybelsus as well as implementing walking into her lifestyle.     Labs ordered: none     PLAN  Continue Rybelsus 14 mg once daily and metformin 500 mg twice daily   Follow up:  6 weeks  by telephone.   PCP follow up: 5/20/24    Nadya Beal, PharmD    Continue all meds under the continuation of care with the referring provider and clinical pharmacy team.

## 2024-03-28 ENCOUNTER — PHARMACY VISIT (OUTPATIENT)
Dept: PHARMACY | Facility: CLINIC | Age: 59
End: 2024-03-28
Payer: COMMERCIAL

## 2024-04-08 ENCOUNTER — SPECIALTY PHARMACY (OUTPATIENT)
Dept: PHARMACY | Facility: CLINIC | Age: 59
End: 2024-04-08

## 2024-04-11 ENCOUNTER — TELEPHONE (OUTPATIENT)
Dept: PHARMACY | Facility: HOSPITAL | Age: 59
End: 2024-04-11
Payer: MEDICARE

## 2024-04-11 NOTE — TELEPHONE ENCOUNTER
04/11/24    Referring Provider: Misti Contreras MD    Binta Mccann reached out since she has been experiencing unbearable GI adverse effects including consistently feeling nauseous, constant acid reflux and constipation after Rybelsus was increased to 14 mg by Dr. Contreras. Patient denies any specific foods causing this. Patient does have a history of trialing multiple antidiabetics (Trulicity, Januvia, Jardiance) and failing due to GI adverse effects.     Current A1c 9.2%. Patient notes her FBG was 180 mg/dL this morning. Other diabetes medications patient is taking is metformin 500 mg XR BID.     Recommended patient titrate her metformin to max dose 1000 mg BID. Discussed possibly trying Mounjaro, however, chances of nausea are high but have had some success in past with some patients who have experienced side effects with other GLP's. Discussed sulfonylureas but patient does not want to gain weight as it is one of its side effects. Last resort would be an option like pioglitazone or even long acting insulin.     Advised patient to try metformin at max dose until our telephone follow up in May and encouraged to reach out before then if fasting blood sugars consistently >180 mg/dL. Encouraged to work on diet and exercise as we are running out of options for treating her blood sugars. Patient in agreement.     Follow up: 5/7/24  PCP Follow Up: 5/20/24    Nadya Beal, DinorahD

## 2024-04-19 DIAGNOSIS — E11.9 TYPE 2 DIABETES MELLITUS WITHOUT COMPLICATION, WITHOUT LONG-TERM CURRENT USE OF INSULIN (MULTI): Primary | ICD-10-CM

## 2024-04-19 PROCEDURE — RXMED WILLOW AMBULATORY MEDICATION CHARGE

## 2024-04-19 RX ORDER — METFORMIN HYDROCHLORIDE 500 MG/1
1000 TABLET, EXTENDED RELEASE ORAL
Qty: 360 TABLET | Refills: 3 | Status: SHIPPED | OUTPATIENT
Start: 2024-04-19

## 2024-04-19 NOTE — TELEPHONE ENCOUNTER
Patient reached out needing refills on Metformin 500 mg XR 2 tabs twice daily. Will send refill to [facility].     Of note, metformin was recently titrated up to max dose on 4/11/24 after patient experienced intolerable side effects from Rybelsus 14 mg.     Last visit with Dr. Contreras: 2/2024  Last PharmD follow up: 3/26/24  Next PharmD follow up: 5/7/24    Nadya Beal, PharmD

## 2024-04-22 DIAGNOSIS — E11.9 TYPE 2 DIABETES MELLITUS WITHOUT COMPLICATION, WITHOUT LONG-TERM CURRENT USE OF INSULIN (MULTI): ICD-10-CM

## 2024-04-25 ENCOUNTER — PHARMACY VISIT (OUTPATIENT)
Dept: PHARMACY | Facility: CLINIC | Age: 59
End: 2024-04-25
Payer: COMMERCIAL

## 2024-05-07 ENCOUNTER — TELEMEDICINE (OUTPATIENT)
Dept: PHARMACY | Facility: HOSPITAL | Age: 59
End: 2024-05-07
Payer: MEDICARE

## 2024-05-07 DIAGNOSIS — E11.9 TYPE 2 DIABETES MELLITUS WITHOUT COMPLICATION, WITHOUT LONG-TERM CURRENT USE OF INSULIN (MULTI): Primary | ICD-10-CM

## 2024-05-07 PROCEDURE — RXMED WILLOW AMBULATORY MEDICATION CHARGE

## 2024-05-07 NOTE — PROGRESS NOTES
"Pharmacist Clinic: Diabetes   Follow Up Visit  Binta Mccann is a 58 y.o. female was referred to Clinical Pharmacy Team for diabetes management.     Referring Provider: Misti Contreras MD    Subjective   HPI  Known diabetes complications include  none   Endocrinology provider? No  Special needs/barriers to therapy: PA for Rybelsus (Approved 2/27/24)    Diabetes Pharmacotherapy   Rybelsus 14 mg 1 tablet by mouth once a day -- held since 4/11/24  Metformin  mg 2 tablets by mouth 2 times a day    Previous Diabetes Pharmacotherapy    Trulicity - nausea/vomiting   Jardiance - dehydrated all the time  Januvia - not bringing down her A1c       Adverse Effects: Patient denies any GI adverse effects (Upset stomach/diarrhea/constipation/nausea/vomiting)  Adherence: Takes medication as directed and reports no missed doses  Affordability/Accessibility: Rybelsus 14 mg PA approved 2/27      Glucose Monitoring:  Patient is using glucometer;      Current BG Measurements:  5/7/24:  FBG   146  161  132  147  161  161  163  165  165  172  145  153  181  175     Previous BG Measurements:  FBG     144   164  178  167  153  132  161  131  153  161  148  136  136  148  172     Hypoglycemia? Patient denies signs and symptoms and No BG readings < 80 mg/dL  Hyperglycemia? Denies signs and symptoms; Highest reading recently 250 mg/dL      Diabetic Eye Exam: Up to date, completed 2023    Foot Exam:  checks feet daily      Diet: in general, a \"healthy\" diet  , well balanced. Eats lots of meat and limited amount of vegetables  5/7: Trying to stay away from fried foods and sweets       Physical activity: no regular exercise; States once the weather becomes warmer, will walk a lot more   5/7: Has not started walking more yet      Objective   Lab Review  No results found for: \"BILITOT\", \"CALCIUM\", \"CO2\", \"CL\", \"CREATININE\", \"GLUCOSE\", \"ALKPHOS\", \"K\", \"PROT\", \"NA\", \"AST\", \"ALT\", \"BUN\", \"ANIONGAP\", \"MG\", \"PHOS\", \"GGT\", \"LDH\", \"ALBUMIN\", " "\"AMYLASE\", \"LIPASE\", \"GFRF\", \"GFRMALE\"  No results found for: \"TRIG\", \"CHOL\", \"LDLCALC\", \"HDL\"  POC HEMOGLOBIN A1c (%)   Date Value   02/20/2024 9.2 (A)   11/21/2023 7.5 (A)     Hemoglobin A1C (%)   Date Value   08/15/2022 6.0 (A)     There is no height or weight on file to calculate BMI.  Wt Readings from Last 3 Encounters:   02/22/24 83.5 kg (184 lb)   02/20/24 83.7 kg (184 lb 9.6 oz)   12/28/23 82.8 kg (182 lb 9.6 oz)      Urine Albumin: n/a  The ASCVD Risk score (Teofilo SANTOS, et al., 2019) failed to calculate for the following reasons:    Cannot find a previous HDL lab    Cannot find a previous total cholesterol lab    Immunizations:  Influenza Vaccine: 2022  Pneumonia Vaccine: not documented    Drug Interactions  No significant drug-drug interactions exist that require change in therapy.    Assessment/Plan   Problem List Items Addressed This Visit          Endocrine/Metabolic    Diabetes mellitus type 2, uncomplicated (Multi) - Primary    Relevant Medications    semaglutide (Rybelsus) 3 mg tablet     Patients diabetes is poorly controlled with most recent A1c of 9.2% (goal < 7 %). Patient's SMBG are not at goal. Patient has been tolerating metformin dose titration to max dose with no issues. Continues to hold Rybelsus as it was causing severe adverse effects.     Patient has changed up her diet and trying to limit greasy fried foods and sweets. Has not started walking due to problems with her house.     Patient will be due for their next A1c anytime after 5/20/24.     Compliance at present is estimated to be fair. Efforts to improve compliance (if necessary) will be directed at dietary modifications: increasing consumption of vegetables and limiting carbs .    Despite titration of metformin to max dose, patient's blood sugars are still elevated. Plan will be to restart on Rybelsus but at the lowest dose 3 mg. Of note, patient has trialed multiple antidiabetics in the past without success including Trulicity, " Jardiance, Januvia. Discussed possibly trying Mounjaro, however, chances of nausea are high but have had some success in past with some patients who have experienced side effects with other GLP's. Discussed sulfonylureas but patient does not want to gain weight as it is one of its side effects. Last resort would be an option like pioglitazone or even long acting insulin.       Plan:  RESTART  Rybelsus 3 mg once daily     CONTINUE  Metformin 500 mg XR 2 tabs twice daily       Labs ordered: none     Follow up: 5/28/24  PCP Follow Up: 5/20/24    Provided contact info and encouraged patient to reach out with any concerns or questions regarding medications.        Nadya Beal, PharmD    Continue all meds under the continuation of care with the referring provider and clinical pharmacy team.

## 2024-05-10 ENCOUNTER — PHARMACY VISIT (OUTPATIENT)
Dept: PHARMACY | Facility: CLINIC | Age: 59
End: 2024-05-10
Payer: COMMERCIAL

## 2024-05-20 ENCOUNTER — OFFICE VISIT (OUTPATIENT)
Dept: PRIMARY CARE | Facility: CLINIC | Age: 59
End: 2024-05-20
Payer: MEDICARE

## 2024-05-20 VITALS
WEIGHT: 185.1 LBS | BODY MASS INDEX: 29.75 KG/M2 | OXYGEN SATURATION: 96 % | HEIGHT: 66 IN | DIASTOLIC BLOOD PRESSURE: 84 MMHG | SYSTOLIC BLOOD PRESSURE: 126 MMHG | HEART RATE: 92 BPM

## 2024-05-20 DIAGNOSIS — E03.9 HYPOTHYROIDISM, UNSPECIFIED TYPE: ICD-10-CM

## 2024-05-20 DIAGNOSIS — R12 HEARTBURN: ICD-10-CM

## 2024-05-20 DIAGNOSIS — E11.9 TYPE 2 DIABETES MELLITUS WITHOUT COMPLICATION, WITHOUT LONG-TERM CURRENT USE OF INSULIN (MULTI): Primary | ICD-10-CM

## 2024-05-20 DIAGNOSIS — G89.29 CHRONIC LEFT-SIDED LOW BACK PAIN WITH LEFT-SIDED SCIATICA: ICD-10-CM

## 2024-05-20 DIAGNOSIS — M54.42 CHRONIC LEFT-SIDED LOW BACK PAIN WITH LEFT-SIDED SCIATICA: ICD-10-CM

## 2024-05-20 DIAGNOSIS — I11.9 HYPERTENSIVE HEART DISEASE WITHOUT HEART FAILURE: ICD-10-CM

## 2024-05-20 PROBLEM — M54.40 LUMBAGO WITH SCIATICA: Status: ACTIVE | Noted: 2024-05-20

## 2024-05-20 LAB — POC HEMOGLOBIN A1C: 7.4 % (ref 4.2–6.5)

## 2024-05-20 PROCEDURE — 3074F SYST BP LT 130 MM HG: CPT | Performed by: INTERNAL MEDICINE

## 2024-05-20 PROCEDURE — 1036F TOBACCO NON-USER: CPT | Performed by: INTERNAL MEDICINE

## 2024-05-20 PROCEDURE — 3079F DIAST BP 80-89 MM HG: CPT | Performed by: INTERNAL MEDICINE

## 2024-05-20 PROCEDURE — 4010F ACE/ARB THERAPY RXD/TAKEN: CPT | Performed by: INTERNAL MEDICINE

## 2024-05-20 PROCEDURE — 3008F BODY MASS INDEX DOCD: CPT | Performed by: INTERNAL MEDICINE

## 2024-05-20 PROCEDURE — 83036 HEMOGLOBIN GLYCOSYLATED A1C: CPT | Performed by: INTERNAL MEDICINE

## 2024-05-20 PROCEDURE — 99214 OFFICE O/P EST MOD 30 MIN: CPT | Performed by: INTERNAL MEDICINE

## 2024-05-20 RX ORDER — KETOROLAC TROMETHAMINE 10 MG/1
10 TABLET, FILM COATED ORAL 2 TIMES DAILY PRN
Qty: 10 TABLET | Refills: 0 | Status: SHIPPED | OUTPATIENT
Start: 2024-05-20 | End: 2024-05-25

## 2024-05-20 RX ORDER — OMEPRAZOLE 20 MG/1
20 CAPSULE, DELAYED RELEASE ORAL DAILY
Qty: 30 CAPSULE | Refills: 1 | Status: SHIPPED | OUTPATIENT
Start: 2024-05-20 | End: 2024-07-19

## 2024-05-20 ASSESSMENT — PATIENT HEALTH QUESTIONNAIRE - PHQ9
2. FEELING DOWN, DEPRESSED OR HOPELESS: NOT AT ALL
SUM OF ALL RESPONSES TO PHQ9 QUESTIONS 1 AND 2: 0
1. LITTLE INTEREST OR PLEASURE IN DOING THINGS: NOT AT ALL

## 2024-05-20 NOTE — PROGRESS NOTES
"Subjective   Patient ID: Binta Mccann is a 58 y.o. female who presents for Follow-up (Pt having back pain.).    HPI   On Rybelsus 3 mg now.was ok for 5 days.has upset stomach again  Has bloating  No vomiting.has nausea  Working with pharmacy.  Blood sugar is lower than  before.  Fasting is around 160  Has made diet changes.  Diet still has a lot of Pa toast etc.  She thinks portion control is an issue at this time.  Has stopped eating fast food and fried foods   Due for  eye exam    Back pain is worse again.  Has follow-up with specialist.  Currently not on medication.  Stopped Lyrica few months ago since it did not help.  Pain is on the left side and radiates across  No sciatica today  Would like some Toradol prescription  Seeing cardiologist regarding hypertension and heart disease.  Has follow-up  he also follows her thyroid labs  Review of Systems  No fever chills or night sweats  No weight loss    Objective   /84   Pulse 92   Ht 1.676 m (5' 6\")   Wt 84 kg (185 lb 1.6 oz)   SpO2 96%   BMI 29.88 kg/m²     Physical Exam  Not in acute distress  No pallor or icterus  Neck supple  Chest is clear  CVS: S1-S2 regular rate and rhythm  Abdomen soft nontender  Examination of spine reveals no tenderness.  Left gluteus naren tight and tender  Extremities no edema  Mood and affect normal  Assessment/Plan   Problem List Items Addressed This Visit             ICD-10-CM    Diabetes mellitus type 2, uncomplicated (Multi) - Primary E11.9    Relevant Orders    POCT glycosylated hemoglobin (Hb A1C) manually resulted (Completed)    Hypertensive cardiovascular disease I11.9    Hypothyroidism E03.9    Lumbago with sciatica M54.40    Relevant Medications    ketorolac (Toradol) 10 mg tablet     Other Visit Diagnoses         Codes    Heartburn     R12    Relevant Medications    omeprazole (PriLOSEC) 20 mg DR capsule        Diabetes control has improved from last visit.  She is currently on Rybelsus 3 mg and metformin " 2000.  Recommended looking at portion sizes.  Try to combine protein and healthy fats and carbohydrates during each meal rather than just a piece of toast or pasta  Take Rybelsus around 8:00 and eat food 30 minutes later.  Will give her PPI daily to see if that helps with her symptoms  Continue to avoid fried foods and processed foods  Schedule eye exam  Will give short-term Toradol  Take PPI daily  See cardiologist as scheduled  Apply heat and try to release muscle by doing gentle massage  Follow-up in 3 months  Follow up in 3 months

## 2024-05-28 ENCOUNTER — TELEMEDICINE (OUTPATIENT)
Dept: PHARMACY | Facility: HOSPITAL | Age: 59
End: 2024-05-28
Payer: MEDICARE

## 2024-05-28 DIAGNOSIS — E11.9 TYPE 2 DIABETES MELLITUS WITHOUT COMPLICATION, WITHOUT LONG-TERM CURRENT USE OF INSULIN (MULTI): Primary | ICD-10-CM

## 2024-05-28 NOTE — PROGRESS NOTES
"Pharmacist Clinic: Diabetes   Follow Up Visit  Binta Mccann is a 58 y.o. female was referred to Clinical Pharmacy Team for diabetes management.     Referring Provider: Misti Contreras MD    Subjective   HPI  Known diabetes complications include  none   Endocrinology provider? No  Special needs/barriers to therapy: PA for Rybelsus (Approved 2/27/24)    Diabetes Pharmacotherapy   Rybelsus 3 mg 1 tablet by mouth once a day   Metformin  mg 2 tablets by mouth 2 times a day    Previous Diabetes Pharmacotherapy    Trulicity - nausea/vomiting   Jardiance - dehydrated all the time  Januvia - not bringing down her A1c       Adverse Effects: Patient denies any GI adverse effects (Upset stomach/diarrhea/constipation/nausea/vomiting)  Adherence: Takes medication as directed and reports no missed doses  Affordability/Accessibility: Rybelsus 14 mg PA approved 2/27      Glucose Monitoring:  Patient is using glucometer;      Current BG Measurements:  5/28/24:  FBG   169  153  170  154  161  155  140  142  135  135  155  140  142       Previous BG Measurements:  5/7/24:  FBG   146  161  132  147  161  161  163  165  165  172  145  153  181  175     Hypoglycemia? Patient denies signs and symptoms and No BG readings < 80 mg/dL  Hyperglycemia? Denies signs and symptoms; Highest reading recently 250 mg/dL      Diabetic Eye Exam: Up to date, completed 2023    Foot Exam:  checks feet daily      Diet: in general, a \"healthy\" diet  , well balanced. Eats lots of meat and limited amount of vegetables  5/7: Trying to stay away from fried foods and sweets       Physical activity: no regular exercise; States once the weather becomes warmer, will walk a lot more   5/7: Has not started walking more yet      Objective   Lab Review  No results found for: \"BILITOT\", \"CALCIUM\", \"CO2\", \"CL\", \"CREATININE\", \"GLUCOSE\", \"ALKPHOS\", \"K\", \"PROT\", \"NA\", \"AST\", \"ALT\", \"BUN\", \"ANIONGAP\", \"MG\", \"PHOS\", \"GGT\", \"LDH\", \"ALBUMIN\", \"AMYLASE\", \"LIPASE\", " "\"GFRF\", \"GFRMALE\"  No results found for: \"TRIG\", \"CHOL\", \"LDLCALC\", \"HDL\"  POC HEMOGLOBIN A1c (%)   Date Value   05/20/2024 7.4 (A)   02/20/2024 9.2 (A)   11/21/2023 7.5 (A)     There is no height or weight on file to calculate BMI.  Wt Readings from Last 3 Encounters:   05/20/24 84 kg (185 lb 1.6 oz)   02/22/24 83.5 kg (184 lb)   02/20/24 83.7 kg (184 lb 9.6 oz)      Urine Albumin: n/a  The ASCVD Risk score (Teofilo DK, et al., 2019) failed to calculate for the following reasons:    Cannot find a previous HDL lab    Cannot find a previous total cholesterol lab    Immunizations:  Influenza Vaccine: 2022  Pneumonia Vaccine: not documented    Drug Interactions  No significant drug-drug interactions exist that require change in therapy.    Assessment/Plan   Problem List Items Addressed This Visit          Endocrine/Metabolic    Diabetes mellitus type 2, uncomplicated (Multi) - Primary    Relevant Medications    semaglutide (Rybelsus) 3 mg tablet       Patients diabetes is improving  with most recent A1c of 7.4% (goal < 7 %) from 9.2% in February. Patient's SMBG are not at goal. Patient has been tolerating Rybelsus 3 mg after we restarted it. Dr. Contreras prescribed a PPI to help with the side effects. Blood sugars have improved and patient continues to cut out things that cause her blood sugar to elevate.     Patient has continued to change up her diet and trying to limit greasy fried foods and sweets as she believes it is mainly her diet that is contributing to the elevated blood sugars.     Patient will be due for their next A1c anytime after 8/20/24.     Compliance at present is estimated to be fair. Efforts to improve compliance (if necessary) will be directed at dietary modifications: increasing consumption of vegetables and limiting carbs .      Plan:  CONTINUE  Metformin 500 mg XR 2 tabs twice daily   Rybelsus 3 mg once daily       Labs ordered: none     Follow up: 7/9/24  PCP Follow Up: 8/19/24    Provided " contact info and encouraged patient to reach out with any concerns or questions regarding medications.        Nadya Beal, Lacy    Continue all meds under the continuation of care with the referring provider and clinical pharmacy team.

## 2024-05-30 PROCEDURE — RXMED WILLOW AMBULATORY MEDICATION CHARGE

## 2024-06-04 ENCOUNTER — PHARMACY VISIT (OUTPATIENT)
Dept: PHARMACY | Facility: CLINIC | Age: 59
End: 2024-06-04
Payer: COMMERCIAL

## 2024-06-05 ENCOUNTER — PATIENT MESSAGE (OUTPATIENT)
Dept: PRIMARY CARE | Facility: CLINIC | Age: 59
End: 2024-06-05
Payer: MEDICARE

## 2024-06-05 DIAGNOSIS — E11.9 TYPE 2 DIABETES MELLITUS WITHOUT COMPLICATION, WITHOUT LONG-TERM CURRENT USE OF INSULIN (MULTI): ICD-10-CM

## 2024-06-05 DIAGNOSIS — E11.9 TYPE 2 DIABETES MELLITUS WITHOUT COMPLICATION, WITHOUT LONG-TERM CURRENT USE OF INSULIN (MULTI): Primary | ICD-10-CM

## 2024-06-05 RX ORDER — BLOOD-GLUCOSE METER
EACH MISCELLANEOUS
Qty: 100 STRIP | Refills: 2 | Status: SHIPPED | OUTPATIENT
Start: 2024-06-05 | End: 2024-06-06 | Stop reason: CLARIF

## 2024-06-05 RX ORDER — BLOOD-GLUCOSE METER
EACH MISCELLANEOUS
Qty: 100 STRIP | Refills: 2 | Status: SHIPPED | OUTPATIENT
Start: 2024-06-05 | End: 2024-06-05

## 2024-06-06 PROCEDURE — RXMED WILLOW AMBULATORY MEDICATION CHARGE

## 2024-06-06 RX ORDER — IBUPROFEN 200 MG
CAPSULE ORAL
Qty: 100 STRIP | Refills: 11 | Status: SHIPPED | OUTPATIENT
Start: 2024-06-06

## 2024-06-06 RX ORDER — LANCETS
EACH MISCELLANEOUS
Qty: 100 EACH | Refills: 11 | Status: SHIPPED | OUTPATIENT
Start: 2024-06-06

## 2024-06-06 RX ORDER — DEXTROSE 4 G
TABLET,CHEWABLE ORAL
Qty: 1 EACH | Refills: 0 | Status: SHIPPED | OUTPATIENT
Start: 2024-06-06

## 2024-06-06 RX ORDER — ISOPROPYL ALCOHOL 70 ML/100ML
SWAB TOPICAL
Qty: 100 EACH | Refills: 11 | Status: SHIPPED | OUTPATIENT
Start: 2024-06-06

## 2024-06-11 ENCOUNTER — PHARMACY VISIT (OUTPATIENT)
Dept: PHARMACY | Facility: CLINIC | Age: 59
End: 2024-06-11
Payer: COMMERCIAL

## 2024-07-09 ENCOUNTER — APPOINTMENT (OUTPATIENT)
Dept: PHARMACY | Facility: HOSPITAL | Age: 59
End: 2024-07-09
Payer: MEDICARE

## 2024-07-09 DIAGNOSIS — E11.9 TYPE 2 DIABETES MELLITUS WITHOUT COMPLICATION, WITHOUT LONG-TERM CURRENT USE OF INSULIN (MULTI): ICD-10-CM

## 2024-07-09 PROCEDURE — RXMED WILLOW AMBULATORY MEDICATION CHARGE

## 2024-07-09 NOTE — PROGRESS NOTES
"Pharmacist Clinic: Diabetes   Follow Up Visit  Binta Mccann is a 58 y.o. female was referred to Clinical Pharmacy Team for diabetes management.     Referring Provider: Misti Contreras MD    Subjective   HPI  Known diabetes complications include  none   Endocrinology provider? No  Special needs/barriers to therapy: PA for Rybelsus (Approved 2/27/24)    Diabetes Pharmacotherapy   Rybelsus 3 mg 1 tablet by mouth once a day   Metformin  mg 2 tablets by mouth 2 times a day    Previous Diabetes Pharmacotherapy    Trulicity - nausea/vomiting   Jardiance - dehydrated all the time  Januvia - not bringing down her A1c       Adverse Effects: Patient denies any GI adverse effects (Upset stomach/diarrhea/constipation/nausea/vomiting)  Adherence: Takes medication as directed and reports no missed doses  Affordability/Accessibility: Jaden ANDRADE approved 2/27      Glucose Monitoring:  Patient is using glucometer;      Current BG Measurements:  FBG   138  146  145  169  158  190  145  161  177  190  143  157  167  146  175  164  155  168     Previous BG Measurements:  5/7/24:  FBG   146  161  132  147  161  161  163  165  165  172  145  153  181  175     5/28/24:  FBG   169  153  170  154  161  155  140  142  135  135  155  140  142       Hypoglycemia? Patient denies signs and symptoms and No BG readings < 80 mg/dL  Hyperglycemia? Denies signs and symptoms; Highest reading recently 250 mg/dL      Diabetic Eye Exam: Up to date, completed 2023    Foot Exam:  checks feet daily      Diet: in general, a \"healthy\" diet  , well balanced. Eats lots of meat and limited amount of vegetables  5/7: Trying to stay away from fried foods and sweets       Physical activity: no regular exercise; States once the weather becomes warmer, will walk a lot more   5/7: Has not started walking more yet      Objective   Lab Review  No results found for: \"BILITOT\", \"CALCIUM\", \"CO2\", \"CL\", \"CREATININE\", \"GLUCOSE\", \"ALKPHOS\", \"K\", \"PROT\", \"NA\", " "\"AST\", \"ALT\", \"BUN\", \"ANIONGAP\", \"MG\", \"PHOS\", \"GGT\", \"LDH\", \"ALBUMIN\", \"AMYLASE\", \"LIPASE\", \"GFRF\", \"GFRMALE\"  No results found for: \"TRIG\", \"CHOL\", \"LDLCALC\", \"HDL\"  POC HEMOGLOBIN A1c (%)   Date Value   05/20/2024 7.4 (A)   02/20/2024 9.2 (A)   11/21/2023 7.5 (A)     Hemoglobin A1C (%)   Date Value   06/07/2024 6.7 (H)     There is no height or weight on file to calculate BMI.  Wt Readings from Last 3 Encounters:   05/20/24 84 kg (185 lb 1.6 oz)   02/22/24 83.5 kg (184 lb)   02/20/24 83.7 kg (184 lb 9.6 oz)      Urine Albumin: n/a  The ASCVD Risk score (Teofilo SANTOS, et al., 2019) failed to calculate for the following reasons:    Cannot find a previous HDL lab    Cannot find a previous total cholesterol lab    Immunizations:  Influenza Vaccine: 2022  Pneumonia Vaccine: not documented    Drug Interactions  No significant drug-drug interactions exist that require change in therapy.    Assessment/Plan   Problem List Items Addressed This Visit          Endocrine/Metabolic    Diabetes mellitus type 2, uncomplicated (Multi)    Relevant Medications    semaglutide (Rybelsus) 3 mg tablet     Patients diabetes is improving  with most recent A1c of 6.7% (goal < 7 %) from 9.2% in February. Patient's SMBG are at goal. Patient continues to tolerate Rybelsus 3 mg. Blood sugars have improved and patient continues to cut out things that cause her blood sugar to elevate. Patient's ex- recently passed away which she correlates her elevated readings to.     Patient has continued to change up her diet and trying to limit greasy fried foods and sweets as she believes it is mainly her diet that is contributing to the elevated blood sugars.     Compliance at present is estimated to be fair. Efforts to improve compliance (if necessary) will be directed at dietary modifications: increasing consumption of vegetables and limiting carbs .      Plan:  CONTINUE  Metformin 500 mg XR 2 tabs twice daily   Rybelsus 3 mg once daily       Labs " ordered: none     Follow up: 8/6/24  PCP Follow Up: 8/19/24    Provided contact info and encouraged patient to reach out with any concerns or questions regarding medications.        Nadya Beal, PharmD    Continue all meds under the continuation of care with the referring provider and clinical pharmacy team.

## 2024-07-11 ENCOUNTER — PHARMACY VISIT (OUTPATIENT)
Dept: PHARMACY | Facility: CLINIC | Age: 59
End: 2024-07-11
Payer: COMMERCIAL

## 2024-08-01 PROCEDURE — RXMED WILLOW AMBULATORY MEDICATION CHARGE

## 2024-08-05 ENCOUNTER — PHARMACY VISIT (OUTPATIENT)
Dept: PHARMACY | Facility: CLINIC | Age: 59
End: 2024-08-05
Payer: COMMERCIAL

## 2024-08-06 ENCOUNTER — APPOINTMENT (OUTPATIENT)
Dept: PHARMACY | Facility: HOSPITAL | Age: 59
End: 2024-08-06
Payer: MEDICARE

## 2024-08-06 DIAGNOSIS — E11.9 TYPE 2 DIABETES MELLITUS WITHOUT COMPLICATION, WITHOUT LONG-TERM CURRENT USE OF INSULIN (MULTI): Primary | ICD-10-CM

## 2024-08-06 PROCEDURE — RXMED WILLOW AMBULATORY MEDICATION CHARGE

## 2024-08-06 NOTE — PROGRESS NOTES
"Pharmacist Clinic: Diabetes   Follow Up Visit  Binta Mccann is a 58 y.o. female was referred to Clinical Pharmacy Team for diabetes management.     Referring Provider: Misti Contreras MD    Subjective   HPI  Known diabetes complications include  none   Endocrinology provider? No  Special needs/barriers to therapy: PA for Rybelsus (Approved 2/27/24)    Diabetes Pharmacotherapy   Rybelsus 3 mg 1 tablet by mouth once a day   Metformin  mg 2 tablets by mouth 2 times a day    Previous Diabetes Pharmacotherapy    Trulicity - nausea/vomiting   Jardiance - dehydrated all the time  Januvia - not bringing down her A1c       Adverse Effects: Patient denies any GI adverse effects (Upset stomach/diarrhea/constipation/nausea/vomiting)  Adherence: Takes medication as directed and reports no missed doses  Affordability/Accessibility: Jaden ANDRADE approved 2/27      Glucose Monitoring:  Patient is using glucometer;      Current BG Measurements:  FBG   175  168  148  152  170  176  154  149  169  184  200  165  195  160  169  153  176  158  180  147  170  167  168  156   Average: 167 mg/dL    Previous BG Measurements:  7/9/24 average: 160 mg/dL  5/7/24:  FBG   146  161  132  147  161  161  163  165  165  172  145  153  181  175     5/28/24:  FBG   169  153  170  154  161  155  140  142  135  135  155  140  142       Hypoglycemia? Patient denies signs and symptoms and No BG readings < 80 mg/dL  Hyperglycemia? Denies signs and symptoms; Highest reading recently 250 mg/dL      Diabetic Eye Exam: Up to date, completed 2023    Foot Exam:  checks feet daily      Diet: in general, a \"healthy\" diet  , well balanced. Eats lots of meat and limited amount of vegetables  5/7: Trying to stay away from fried foods and sweets       Physical activity: no regular exercise; States once the weather becomes warmer, will walk a lot more   5/7: Has not started walking more yet      Objective   Lab Review  No results found for: \"BILITOT\", " "\"CALCIUM\", \"CO2\", \"CL\", \"CREATININE\", \"GLUCOSE\", \"ALKPHOS\", \"K\", \"PROT\", \"NA\", \"AST\", \"ALT\", \"BUN\", \"ANIONGAP\", \"MG\", \"PHOS\", \"GGT\", \"LDH\", \"ALBUMIN\", \"AMYLASE\", \"LIPASE\", \"GFRF\", \"GFRMALE\"  No results found for: \"TRIG\", \"CHOL\", \"LDLCALC\", \"HDL\"  POC HEMOGLOBIN A1c (%)   Date Value   05/20/2024 7.4 (A)   02/20/2024 9.2 (A)   11/21/2023 7.5 (A)     Hemoglobin A1C (%)   Date Value   06/07/2024 6.7 (H)     There is no height or weight on file to calculate BMI.  Wt Readings from Last 3 Encounters:   05/20/24 84 kg (185 lb 1.6 oz)   02/22/24 83.5 kg (184 lb)   02/20/24 83.7 kg (184 lb 9.6 oz)      Urine Albumin: n/a  The ASCVD Risk score (Teofilo SANTOS, et al., 2019) failed to calculate for the following reasons:    Cannot find a previous HDL lab    Cannot find a previous total cholesterol lab    Immunizations:  Influenza Vaccine: 2022  Pneumonia Vaccine: not documented    Drug Interactions  No significant drug-drug interactions exist that require change in therapy.    Assessment/Plan   Problem List Items Addressed This Visit          Endocrine/Metabolic    Diabetes mellitus type 2, uncomplicated (Multi) - Primary    Relevant Medications    semaglutide (Rybelsus) 7 mg tablet       Patients diabetes is improving  with most recent A1c of 6.7% (goal < 7 %) from 9.2% in February. Patient's SMBG averages have worsened from 160s to 167 the last month. Anticipate her A1c to worsen. Patient continues to tolerate Rybelsus 3 mg. Patient continues to cut out things that cause her blood sugar to elevate. Patient's ex- recently passed away which she correlates her elevated readings to. Will titrate up the Rybelsus to 7 mg for further glycemic control.     Patient has continued to change up her diet and trying to limit greasy fried foods and sweets as she believes it is mainly her diet that is contributing to the elevated blood sugars.     Compliance at present is estimated to be fair. Efforts to improve compliance (if necessary) " will be directed at dietary modifications: increasing consumption of vegetables and limiting carbs .      Plan:  INCREASE   Rybelsus 3 mg to 7 mg once daily     CONTINUE  Metformin 500 mg XR 2 tabs twice daily       Labs ordered: none     Follow up: 9/3/24  PCP Follow Up: 8/19/24    Provided contact info and encouraged patient to reach out with any concerns or questions regarding medications.        Nadya Beal, PharmD    Continue all meds under the continuation of care with the referring provider and clinical pharmacy team.

## 2024-08-09 ENCOUNTER — PHARMACY VISIT (OUTPATIENT)
Dept: PHARMACY | Facility: CLINIC | Age: 59
End: 2024-08-09
Payer: COMMERCIAL

## 2024-08-19 ENCOUNTER — APPOINTMENT (OUTPATIENT)
Dept: PRIMARY CARE | Facility: CLINIC | Age: 59
End: 2024-08-19
Payer: MEDICARE

## 2024-08-19 ENCOUNTER — TELEPHONE (OUTPATIENT)
Dept: PHARMACY | Facility: HOSPITAL | Age: 59
End: 2024-08-19

## 2024-08-19 DIAGNOSIS — E11.9 TYPE 2 DIABETES MELLITUS WITHOUT COMPLICATION, WITHOUT LONG-TERM CURRENT USE OF INSULIN (MULTI): Primary | ICD-10-CM

## 2024-08-19 NOTE — TELEPHONE ENCOUNTER
08/19/24    Referring Provider: MD Binta Rain reached out to notify of side effects from dose increase in Rybelsus to 7 mg. She has not been able to tolerate it and feels sick all the time. Will titrate back down to 3 mg. Last A1c 6.7%. May discuss adding additional therapy at follow up visit if blood sugars uncontrolled.     Nadya Beal, DinorahD

## 2024-08-20 ENCOUNTER — APPOINTMENT (OUTPATIENT)
Dept: PRIMARY CARE | Facility: CLINIC | Age: 59
End: 2024-08-20
Payer: MEDICARE

## 2024-08-20 VITALS
DIASTOLIC BLOOD PRESSURE: 86 MMHG | SYSTOLIC BLOOD PRESSURE: 115 MMHG | OXYGEN SATURATION: 99 % | HEART RATE: 94 BPM | BODY MASS INDEX: 28.12 KG/M2 | TEMPERATURE: 97.8 F | HEIGHT: 66 IN | WEIGHT: 175 LBS

## 2024-08-20 DIAGNOSIS — Z12.11 ENCOUNTER FOR COLORECTAL CANCER SCREENING: ICD-10-CM

## 2024-08-20 DIAGNOSIS — E11.9 TYPE 2 DIABETES MELLITUS WITHOUT COMPLICATION, WITHOUT LONG-TERM CURRENT USE OF INSULIN (MULTI): Primary | ICD-10-CM

## 2024-08-20 DIAGNOSIS — Z12.12 ENCOUNTER FOR COLORECTAL CANCER SCREENING: ICD-10-CM

## 2024-08-20 LAB — POC HEMOGLOBIN A1C: 7.8 % (ref 4.2–6.5)

## 2024-08-20 PROCEDURE — 1036F TOBACCO NON-USER: CPT | Performed by: INTERNAL MEDICINE

## 2024-08-20 PROCEDURE — 3008F BODY MASS INDEX DOCD: CPT | Performed by: INTERNAL MEDICINE

## 2024-08-20 PROCEDURE — 99214 OFFICE O/P EST MOD 30 MIN: CPT | Performed by: INTERNAL MEDICINE

## 2024-08-20 PROCEDURE — 4010F ACE/ARB THERAPY RXD/TAKEN: CPT | Performed by: INTERNAL MEDICINE

## 2024-08-20 PROCEDURE — 3079F DIAST BP 80-89 MM HG: CPT | Performed by: INTERNAL MEDICINE

## 2024-08-20 PROCEDURE — 83036 HEMOGLOBIN GLYCOSYLATED A1C: CPT | Performed by: INTERNAL MEDICINE

## 2024-08-20 PROCEDURE — 3074F SYST BP LT 130 MM HG: CPT | Performed by: INTERNAL MEDICINE

## 2024-08-20 RX ORDER — METFORMIN HYDROCHLORIDE 500 MG/1
500 TABLET, EXTENDED RELEASE ORAL
Qty: 180 TABLET | Refills: 3 | Status: SHIPPED | OUTPATIENT
Start: 2024-08-20 | End: 2025-08-20

## 2024-08-20 RX ORDER — BLOOD-GLUCOSE SENSOR
EACH MISCELLANEOUS
Qty: 3 EACH | Refills: 3 | Status: SHIPPED | OUTPATIENT
Start: 2024-08-20

## 2024-08-20 RX ORDER — BLOOD-GLUCOSE,RECEIVER,CONT
EACH MISCELLANEOUS
Qty: 1 EACH | Refills: 0 | Status: SHIPPED | OUTPATIENT
Start: 2024-08-20

## 2024-08-20 RX ORDER — TIRZEPATIDE 2.5 MG/.5ML
2.5 INJECTION, SOLUTION SUBCUTANEOUS
Qty: 4 PEN | Refills: 0 | Status: SHIPPED | OUTPATIENT
Start: 2024-08-25 | End: 2024-09-24

## 2024-08-20 NOTE — PROGRESS NOTES
"Subjective   Patient ID: Binta Mccann is a 58 y.o. female who presents for Follow-up (Pt is here for 3 months F/U).    HPI   Fbs 148 ton 150 on Rybelsus 7 mg.has significant GI side effects  Has acid reflux and feeling of vomiting  Wants to go back on 3 mg  On metformin 2000  Could not tolerate Jardiance or Trulicity or Ozempic  Watching diet to some extent  Lost weight  Due for eye exam    Thyroxine dose slightly reduced recently  No exertional chest pain or shortness of breath    Review of Systems  No fever chills night sweats  No rectal bleeding or appetite loss  No PND or orthopnea  No headache or dizziness  Has chronic back pain  Had injection yesterday which has helped  Objective   /86   Pulse 94   Temp 36.6 °C (97.8 °F)   Ht 1.676 m (5' 6\")   Wt 79.4 kg (175 lb)   SpO2 99%   BMI 28.25 kg/m²     Physical Exam  In NAD  No pallor or icterus  Chest is clear  CVs: S1-S2 regular rate and rhythm  Abdomen soft nontender no mass felt and bowel sounds heard    Assessment/Plan   Problem List Items Addressed This Visit             ICD-10-CM    Diabetes mellitus type 2, uncomplicated (Multi) - Primary E11.9    Relevant Medications    metFORMIN XR (Glucophage-XR) 500 mg 24 hr tablet    tirzepatide (Mounjaro) 2.5 mg/0.5 mL pen injector (Start on 8/25/2024)    Dexcom G7 Sensor device    Dexcom G7  misc    Other Relevant Orders    POCT glycosylated hemoglobin (Hb A1C) manually resulted (Completed)    Basic Metabolic Panel     Other Visit Diagnoses         Codes    Encounter for colorectal cancer screening     Z12.11, Z12.12    Relevant Orders    Cologuard® colon cancer screening          Diabetes remains uncontrolled.  Has intolerance to multiple medication  Could improve dietary choices  She is slowly working on it  Recommended more whole foods and plant-based options and portion control  Avoid snacking  Since Rybelsus 7 mg is not tolerated we will try Mounjaro  Try to limit total carbohydrates to 100 " g daily maximum  Prescribe Dexcom for close monitoring  Cologuard for screening  Follow-up in 3 months  Schedule eye exam

## 2024-08-27 PROCEDURE — RXMED WILLOW AMBULATORY MEDICATION CHARGE

## 2024-08-30 ENCOUNTER — PHARMACY VISIT (OUTPATIENT)
Dept: PHARMACY | Facility: CLINIC | Age: 59
End: 2024-08-30
Payer: COMMERCIAL

## 2024-09-03 ENCOUNTER — APPOINTMENT (OUTPATIENT)
Dept: PHARMACY | Facility: HOSPITAL | Age: 59
End: 2024-09-03
Payer: MEDICARE

## 2024-09-17 PROCEDURE — RXMED WILLOW AMBULATORY MEDICATION CHARGE

## 2024-09-19 ENCOUNTER — PHARMACY VISIT (OUTPATIENT)
Dept: PHARMACY | Facility: CLINIC | Age: 59
End: 2024-09-19
Payer: COMMERCIAL

## 2024-09-24 ENCOUNTER — APPOINTMENT (OUTPATIENT)
Dept: PHARMACY | Facility: HOSPITAL | Age: 59
End: 2024-09-24
Payer: MEDICARE

## 2024-09-24 DIAGNOSIS — E11.9 TYPE 2 DIABETES MELLITUS WITHOUT COMPLICATION, WITHOUT LONG-TERM CURRENT USE OF INSULIN (MULTI): Primary | ICD-10-CM

## 2024-09-24 RX ORDER — TIRZEPATIDE 5 MG/.5ML
5 INJECTION, SOLUTION SUBCUTANEOUS WEEKLY
Qty: 2 ML | Refills: 1 | Status: SHIPPED | OUTPATIENT
Start: 2024-09-24

## 2024-09-24 NOTE — PROGRESS NOTES
"Pharmacist Clinic: Diabetes   Follow Up Visit  Binta Mccann is a 58 y.o. female was referred to Clinical Pharmacy Team for diabetes management.     Referring Provider: Misti Cnotreras MD    Subjective   HPI  Known diabetes complications include  none   Endocrinology provider? No  Special needs/barriers to therapy: none     Diabetes Pharmacotherapy   Mounjaro 2.5 mg once weekly -- Doses remainin  Metformin  mg 2 tablets by mouth 2 times a day    Previous Diabetes Pharmacotherapy    Trulicity -- nausea/vomiting   Jardiance -- dehydrated all the time  Januvia -- not bringing down her A1c  Rybelsus 7 mg -- intolerance        Adverse Effects: Patient denies any GI adverse effects (Upset stomach/diarrhea/constipation/nausea/vomiting)  Adherence: Takes medication as directed and reports no missed doses  Affordability/Accessibility: no issues      Glucose Monitoring:  Patient is using glucometer;      Current BG Measurements:  FBG   175  168  148  152  170  176  154  149  169  184  200  165  195  160  169  153  176  158  180  147  170  167  168  156   Average: 167 mg/dL    Previous BG Measurements:  24 average: 160 mg/dL  24:  FBG   146  161  132  147  161  161  163  165  165  172  145  153  181  175     24:  FBG   169  153  170  154  161  155  140  142  135  135  155  140  142       Hypoglycemia? Patient denies signs and symptoms and No BG readings < 80 mg/dL  Hyperglycemia? Denies signs and symptoms; Highest reading recently 250 mg/dL      Diabetic Eye Exam: Up to date, completed     Foot Exam:  checks feet daily      Diet: in general, a \"healthy\" diet  , well balanced. Eats lots of meat and limited amount of vegetables  : Trying to stay away from fried foods and sweets       Physical activity: no regular exercise; States once the weather becomes warmer, will walk a lot more   7: Has not started walking more yet      Objective   Lab Review  No results found for: \"BILITOT\", \"CALCIUM\", " "\"CO2\", \"CL\", \"CREATININE\", \"GLUCOSE\", \"ALKPHOS\", \"K\", \"PROT\", \"NA\", \"AST\", \"ALT\", \"BUN\", \"ANIONGAP\", \"MG\", \"PHOS\", \"GGT\", \"LDH\", \"ALBUMIN\", \"AMYLASE\", \"LIPASE\", \"GFRF\", \"GFRMALE\"  No results found for: \"TRIG\", \"CHOL\", \"LDLCALC\", \"HDL\"  POC HEMOGLOBIN A1c (%)   Date Value   08/20/2024 7.8 (A)   05/20/2024 7.4 (A)   02/20/2024 9.2 (A)     Hemoglobin A1C (%)   Date Value   06/07/2024 6.7 (H)     There is no height or weight on file to calculate BMI.  Wt Readings from Last 3 Encounters:   08/20/24 79.4 kg (175 lb)   05/20/24 84 kg (185 lb 1.6 oz)   02/22/24 83.5 kg (184 lb)      Urine Albumin: n/a  The ASCVD Risk score (Teofilo SANTOS, et al., 2019) failed to calculate for the following reasons:    Cannot find a previous HDL lab    Cannot find a previous total cholesterol lab    Immunizations:  Influenza Vaccine: 2022  Pneumonia Vaccine: not documented    Drug Interactions  No significant drug-drug interactions exist that require change in therapy.    Assessment/Plan   Problem List Items Addressed This Visit          Endocrine/Metabolic    Diabetes mellitus type 2, uncomplicated (Multi) - Primary    Relevant Medications    tirzepatide (Mounjaro) 5 mg/0.5 mL pen injector         Patients diabetes is poorly controlled with most recent A1c of 7.8% (goal < 7 %) from 6.7% in June. Patient's SMBG have continued to average in the 160s. Rybelsus was switched to Mounjaro. Patient has completed 4 total doses of Mounjaro 2.5 mg with no reported adverse effects. Discussed titrating dose to 5 mg since the 2.5 mg dose will not provide any glycemic control just mitigating side effects and allowing body to adjust to medication.     Patient has continued to change up her diet and trying to limit greasy fried foods and sweets as she believes it is mainly her diet that is contributing to the elevated blood sugars.     Compliance at present is estimated to be fair. Efforts to improve compliance (if necessary) will be directed at dietary " modifications: increasing consumption of vegetables and limiting carbs .      Plan:  INCREASE  Mounjaro 2.5 mg to 5 mg once weekly     CONTINUE  Metformin 500 mg XR 2 tabs twice daily       Labs ordered: none     Follow up: 10/22/24  PCP Follow Up: 11/20/24    Provided contact info and encouraged patient to reach out with any concerns or questions regarding medications.        Nadya Beal, PharmD    Continue all meds under the continuation of care with the referring provider and clinical pharmacy team.

## 2024-09-30 ENCOUNTER — LAB (OUTPATIENT)
Dept: LAB | Facility: LAB | Age: 59
End: 2024-09-30
Payer: MEDICARE

## 2024-09-30 DIAGNOSIS — E11.9 TYPE 2 DIABETES MELLITUS WITHOUT COMPLICATION, WITHOUT LONG-TERM CURRENT USE OF INSULIN (MULTI): ICD-10-CM

## 2024-09-30 DIAGNOSIS — I11.9 HYPERTENSIVE HEART DISEASE WITHOUT HEART FAILURE: ICD-10-CM

## 2024-09-30 DIAGNOSIS — N18.31 STAGE 3A CHRONIC KIDNEY DISEASE (MULTI): Primary | ICD-10-CM

## 2024-09-30 LAB
ANION GAP SERPL CALC-SCNC: 14 MMOL/L (ref 10–20)
BUN SERPL-MCNC: 23 MG/DL (ref 6–23)
CALCIUM SERPL-MCNC: 9.7 MG/DL (ref 8.6–10.6)
CHLORIDE SERPL-SCNC: 104 MMOL/L (ref 98–107)
CO2 SERPL-SCNC: 27 MMOL/L (ref 21–32)
CREAT SERPL-MCNC: 1.35 MG/DL (ref 0.5–1.05)
EGFRCR SERPLBLD CKD-EPI 2021: 45 ML/MIN/1.73M*2
GLUCOSE SERPL-MCNC: 225 MG/DL (ref 74–99)
POTASSIUM SERPL-SCNC: 4.4 MMOL/L (ref 3.5–5.3)
SODIUM SERPL-SCNC: 141 MMOL/L (ref 136–145)

## 2024-09-30 PROCEDURE — RXMED WILLOW AMBULATORY MEDICATION CHARGE

## 2024-09-30 PROCEDURE — 36415 COLL VENOUS BLD VENIPUNCTURE: CPT

## 2024-09-30 PROCEDURE — 80048 BASIC METABOLIC PNL TOTAL CA: CPT

## 2024-09-30 NOTE — RESULT ENCOUNTER NOTE
Blood sugar is elevated.  Kidney function stable.  Continue same treatment plan  Check ultrasound of kidneys

## 2024-10-01 ENCOUNTER — PHARMACY VISIT (OUTPATIENT)
Dept: PHARMACY | Facility: CLINIC | Age: 59
End: 2024-10-01
Payer: COMMERCIAL

## 2024-10-16 DIAGNOSIS — N18.31 STAGE 3A CHRONIC KIDNEY DISEASE (MULTI): Primary | ICD-10-CM

## 2024-10-18 ENCOUNTER — LAB (OUTPATIENT)
Dept: LAB | Facility: LAB | Age: 59
End: 2024-10-18
Payer: MEDICARE

## 2024-10-18 DIAGNOSIS — N18.31 STAGE 3A CHRONIC KIDNEY DISEASE (MULTI): ICD-10-CM

## 2024-10-18 LAB
CREAT UR-MCNC: 137.9 MG/DL (ref 20–320)
MICROALBUMIN UR-MCNC: 12.5 MG/L
MICROALBUMIN/CREAT UR: 9.1 UG/MG CREAT

## 2024-10-18 PROCEDURE — 82570 ASSAY OF URINE CREATININE: CPT

## 2024-10-18 PROCEDURE — 82043 UR ALBUMIN QUANTITATIVE: CPT

## 2024-10-22 ENCOUNTER — APPOINTMENT (OUTPATIENT)
Dept: PHARMACY | Facility: HOSPITAL | Age: 59
End: 2024-10-22
Payer: MEDICARE

## 2024-10-22 DIAGNOSIS — E11.9 TYPE 2 DIABETES MELLITUS WITHOUT COMPLICATION, WITHOUT LONG-TERM CURRENT USE OF INSULIN (MULTI): Primary | ICD-10-CM

## 2024-10-22 PROCEDURE — RXMED WILLOW AMBULATORY MEDICATION CHARGE

## 2024-10-22 RX ORDER — TIRZEPATIDE 7.5 MG/.5ML
7.5 INJECTION, SOLUTION SUBCUTANEOUS WEEKLY
Qty: 2 ML | Refills: 0 | Status: SHIPPED | OUTPATIENT
Start: 2024-10-22

## 2024-10-22 NOTE — PROGRESS NOTES
"Pharmacist Clinic: Diabetes   Follow Up Visit  Binta Mccann is a 58 y.o. female was referred to Clinical Pharmacy Team for diabetes management.     Referring Provider: Misti Contreras MD    Subjective   HPI  Known diabetes complications include  none   Endocrinology provider? No  Special needs/barriers to therapy: none     Diabetes Pharmacotherapy   Mounjaro 5 mg once weekly -- Doses remainin  Metformin  mg 2 tablets by mouth 2 times a day    Previous Diabetes Pharmacotherapy    Trulicity -- nausea/vomiting   Jardiance -- dehydrated all the time  Januvia -- not bringing down her A1c  Rybelsus 7 mg -- intolerance        Adverse Effects: Patient denies any GI adverse effects (Upset stomach/diarrhea/constipation/nausea/vomiting)  Adherence: Takes medication as directed and reports no missed doses  Affordability/Accessibility: no issues      Glucose Monitoring:  Patient is using glucometer;      Current BG Measurements:  FBG   175  168  148  152  170  176  154  149  169  184  200  165  195  160  169  153  176  158  180  147  170  167  168  156   Average: 167 mg/dL    Previous BG Measurements:  24 average: 160 mg/dL      Hypoglycemia? Patient denies signs and symptoms and No BG readings < 80 mg/dL  Hyperglycemia? Denies signs and symptoms; Highest reading recently 250 mg/dL      Diabetic Eye Exam: Up to date, completed     Foot Exam:  checks feet daily      Diet: in general, a \"healthy\" diet  , well balanced. Eats lots of meat and limited amount of vegetables  : Trying to stay away from fried foods and sweets       Physical activity: no regular exercise; States once the weather becomes warmer, will walk a lot more   : Has not started walking more yet  Updated 10/22: Has begun walking 1-2 times a week on the treadmill    Objective   Lab Review  Lab Results   Component Value Date    CALCIUM 9.7 2024    CO2 27 2024     2024    CREATININE 1.35 (H) 2024    GLUCOSE 225 " "(H) 09/30/2024    K 4.4 09/30/2024     09/30/2024    BUN 23 09/30/2024    ANIONGAP 14 09/30/2024     No results found for: \"TRIG\", \"CHOL\", \"LDLCALC\", \"HDL\"  POC HEMOGLOBIN A1c (%)   Date Value   08/20/2024 7.8 (A)   05/20/2024 7.4 (A)   02/20/2024 9.2 (A)     Hemoglobin A1C (%)   Date Value   06/07/2024 6.7 (H)     There is no height or weight on file to calculate BMI.  Wt Readings from Last 3 Encounters:   08/20/24 79.4 kg (175 lb)   05/20/24 84 kg (185 lb 1.6 oz)   02/22/24 83.5 kg (184 lb)      Urine Albumin: n/a  The ASCVD Risk score (Teofilo SANTOS, et al., 2019) failed to calculate for the following reasons:    Cannot find a previous HDL lab    Cannot find a previous total cholesterol lab    Immunizations:  Influenza Vaccine: 2022  Pneumonia Vaccine: not documented    Drug Interactions  No significant drug-drug interactions exist that require change in therapy.    Assessment/Plan   Problem List Items Addressed This Visit          Endocrine/Metabolic    Diabetes mellitus type 2, uncomplicated (Multi) - Primary    Relevant Medications    tirzepatide (Mounjaro) 7.5 mg/0.5 mL pen injector    Other Relevant Orders    Referral to Clinical Pharmacy       Patients diabetes is poorly controlled with most recent A1c of 7.8% (goal < 7 %) from 6.7% in June. Patient's SMBG have continued to average in the 160s but has seen some in the 120s. Rybelsus was switched to Mounjaro. Patient has completed 4 total doses of Mounjaro 5 mg with no reported adverse effects. Discussed titrating dose to 7.5  for further glycemic control.      Patient has continued to change up her diet and trying to limit greasy fried foods and sweets as she believes it is mainly her diet that is contributing to the elevated blood sugars. Patient has begun walking on the treadmill 1-2 times a week about 20-30 mins each time. Has a goal to work on lifestyle to be able to get off of all her diabetes medications. Explained that it may or may not be " possible, may be able to at least get rid of metformin once we get to highest tolerated dose of Mounjaro. Will discuss weight loss at follow up visit.     Compliance at present is estimated to be fair. Efforts to improve compliance (if necessary) will be directed at dietary modifications: increasing consumption of vegetables and limiting carbs .      Plan:  INCREASE  Mounjaro 5 mg to 7.5 mg once weekly     CONTINUE  Metformin 500 mg XR 2 tabs twice daily       Labs ordered: none     Follow up: 11/19/24  PCP Follow Up: 11/20/24    Provided contact info and encouraged patient to reach out with any concerns or questions regarding medications.        Nadya Beal, PharmD    Continue all meds under the continuation of care with the referring provider and clinical pharmacy team.

## 2024-10-23 LAB — NONINV COLON CA DNA+OCC BLD SCRN STL QL: NEGATIVE

## 2024-10-25 ENCOUNTER — PHARMACY VISIT (OUTPATIENT)
Dept: PHARMACY | Facility: CLINIC | Age: 59
End: 2024-10-25
Payer: COMMERCIAL

## 2024-11-14 NOTE — PROGRESS NOTES
"Pharmacist Clinic: Diabetes   Follow Up Visit  Binta Mccann is a 58 y.o. female was referred to Clinical Pharmacy Team for diabetes management.     Referring Provider: Misti Contreras MD    Subjective   HPI  Known diabetes complications include  none   Endocrinology provider? No  Special needs/barriers to therapy: none     Diabetes Pharmacotherapy   Mounjaro 7.5 once weekly -- Doses remainin  Metformin  mg 2 tablets by mouth 2 times a day    Previous Diabetes Pharmacotherapy    Trulicity -- nausea/vomiting   Jardiance -- dehydrated all the time  Januvia -- not bringing down her A1c  Rybelsus 7 mg -- intolerance        Adverse Effects: Patient denies any GI adverse effects (Upset stomach/diarrhea/constipation/nausea/vomiting)  Adherence: Takes medication as directed and reports no missed doses  Affordability/Accessibility: no issues      Glucose Monitoring:  Patient is using glucometer; Truemetrix    Current BG Measurements:  FBG   113  142  122  136  121  124  134  124   Average FB mg/dL    Previous BG Measurements:  Average: 167 mg/dL      Hypoglycemia? Patient denies signs and symptoms and No BG readings < 80 mg/dL  Hyperglycemia? Denies signs and symptoms; Highest reading recently 145 mg/dL (only checks in mornings)      Diabetic Eye Exam: Up to date, completed     Foot Exam:  checks feet daily      Diet: in general, a \"healthy\" diet  , well balanced. Eats lots of meat and limited amount of vegetables.      Physical activity: no regular exercise; States once the weather becomes warmer, will walk a lot more   Updated 10/22: Has begun walking 1-2 times a week on the treadmill    Objective   Lab Review  Lab Results   Component Value Date    CALCIUM 9.7 2024    CO2 27 2024     2024    CREATININE 1.35 (H) 2024    GLUCOSE 225 (H) 2024    K 4.4 2024     2024    BUN 23 2024    ANIONGAP 14 2024     No results found for: \"TRIG\", " "\"CHOL\", \"LDLCALC\", \"HDL\"  POC HEMOGLOBIN A1c (%)   Date Value   08/20/2024 7.8 (A)   05/20/2024 7.4 (A)   02/20/2024 9.2 (A)     Hemoglobin A1C (%)   Date Value   06/07/2024 6.7 (H)     There is no height or weight on file to calculate BMI.  Wt Readings from Last 3 Encounters:   08/20/24 79.4 kg (175 lb)   05/20/24 84 kg (185 lb 1.6 oz)   02/22/24 83.5 kg (184 lb)      Urine Albumin: n/a  The ASCVD Risk score (Teofilo SANTOS, et al., 2019) failed to calculate for the following reasons:    Cannot find a previous HDL lab    Cannot find a previous total cholesterol lab    Immunizations:  Influenza Vaccine: 2022  Pneumonia Vaccine: not documented    Drug Interactions  No significant drug-drug interactions exist that require change in therapy.    Assessment/Plan   Problem List Items Addressed This Visit       Diabetes mellitus type 2, uncomplicated (Multi) - Primary    Relevant Medications    tirzepatide (Mounjaro) 10 mg/0.5 mL pen injector    Other Relevant Orders    Referral to Clinical Pharmacy         Patients diabetes is poorly controlled with most recent A1c of 7.8% (goal < 7 %) from 6.7% in June. Patient's SMBG have continued to average in the 140s but has seen some in the 110s.  Patient has been tolerating Mounjaro 7.5 mg once weekly with no reported adverse effects. Discussed titrating dose to 10 mg  for further glycemic control with FBGs remaining at the upper limit of normal.    Patient has continued to change up her diet and trying to limit greasy fried foods and sweets as she believes it is mainly her diet that is contributing to the elevated blood sugars. Counseled patient on being mindful of portion sizes. Patient has begun walking on the treadmill 1-2 times a week about 20-30 mins each time. Has a goal to work on lifestyle to be able to get off of all her diabetes medications. Explained that it may or may not be possible, may be able to at least get rid of metformin once we get to highest tolerated dose of " Mounjaro. Will discuss weight loss at follow up visit. Discussed not de-prescribing metformin until updating A1c.     Compliance at present is estimated to be fair. Efforts to improve compliance (if necessary) will be directed at dietary modifications: increasing consumption of vegetables and limiting carbs .      Plan:  INCREASE  Mounjaro 7.5 mg to 10 mg once weekly     CONTINUE  Metformin 1000 mg XR BID        Future Considerations:  - Titrate Mounjaro dose to maximize glycemic control and to tolerance. Potentially discontinue metformin if A1c at goal (<7%) with Mounjaro.   - Patient to get updated POCT A1c at next PCP visit.           Labs ordered: none     Follow up: 12/17/24 @ 1400  PCP Follow Up: 11/20/24    Provided contact info and encouraged patient to reach out with any concerns or questions regarding medications.        Aramis Ramsey, PharmD    Continue all meds under the continuation of care with the referring provider and clinical pharmacy team.

## 2024-11-19 ENCOUNTER — APPOINTMENT (OUTPATIENT)
Dept: PHARMACY | Facility: HOSPITAL | Age: 59
End: 2024-11-19
Payer: MEDICARE

## 2024-11-19 DIAGNOSIS — E11.9 TYPE 2 DIABETES MELLITUS WITHOUT COMPLICATION, WITHOUT LONG-TERM CURRENT USE OF INSULIN (MULTI): Primary | ICD-10-CM

## 2024-11-19 RX ORDER — TIRZEPATIDE 10 MG/.5ML
10 INJECTION, SOLUTION SUBCUTANEOUS WEEKLY
Qty: 2 ML | Refills: 1 | Status: SHIPPED | OUTPATIENT
Start: 2024-11-19

## 2024-11-20 ENCOUNTER — APPOINTMENT (OUTPATIENT)
Dept: PRIMARY CARE | Facility: CLINIC | Age: 59
End: 2024-11-20
Payer: MEDICARE

## 2024-11-20 VITALS
OXYGEN SATURATION: 96 % | WEIGHT: 176.9 LBS | BODY MASS INDEX: 28.43 KG/M2 | SYSTOLIC BLOOD PRESSURE: 106 MMHG | HEIGHT: 66 IN | HEART RATE: 86 BPM | DIASTOLIC BLOOD PRESSURE: 74 MMHG

## 2024-11-20 DIAGNOSIS — N18.31 CHRONIC KIDNEY DISEASE, STAGE 3A (MULTI): ICD-10-CM

## 2024-11-20 DIAGNOSIS — E11.9 TYPE 2 DIABETES MELLITUS WITHOUT COMPLICATION, WITHOUT LONG-TERM CURRENT USE OF INSULIN (MULTI): Primary | ICD-10-CM

## 2024-11-20 DIAGNOSIS — M19.049 ARTHRITIS OF FINGER: ICD-10-CM

## 2024-11-20 LAB — POC HEMOGLOBIN A1C: 7.3 % (ref 4.2–6.5)

## 2024-11-20 PROCEDURE — 99213 OFFICE O/P EST LOW 20 MIN: CPT | Performed by: INTERNAL MEDICINE

## 2024-11-20 PROCEDURE — 3008F BODY MASS INDEX DOCD: CPT | Performed by: INTERNAL MEDICINE

## 2024-11-20 PROCEDURE — 83036 HEMOGLOBIN GLYCOSYLATED A1C: CPT | Performed by: INTERNAL MEDICINE

## 2024-11-20 PROCEDURE — 3061F NEG MICROALBUMINURIA REV: CPT | Performed by: INTERNAL MEDICINE

## 2024-11-20 PROCEDURE — 3078F DIAST BP <80 MM HG: CPT | Performed by: INTERNAL MEDICINE

## 2024-11-20 PROCEDURE — 4010F ACE/ARB THERAPY RXD/TAKEN: CPT | Performed by: INTERNAL MEDICINE

## 2024-11-20 PROCEDURE — 1036F TOBACCO NON-USER: CPT | Performed by: INTERNAL MEDICINE

## 2024-11-20 PROCEDURE — 3074F SYST BP LT 130 MM HG: CPT | Performed by: INTERNAL MEDICINE

## 2024-11-20 RX ORDER — DICLOFENAC SODIUM 10 MG/G
GEL TOPICAL
Qty: 100 G | Refills: 0 | Status: SHIPPED | OUTPATIENT
Start: 2024-11-20

## 2024-11-20 RX ORDER — LEVOTHYROXINE SODIUM 125 UG/1
125 TABLET ORAL
COMMUNITY
Start: 2024-11-02

## 2024-11-20 RX ORDER — PREGABALIN 100 MG/1
100 CAPSULE ORAL
COMMUNITY

## 2024-11-20 NOTE — PROGRESS NOTES
"Subjective   Patient ID: Binta Mccann is a 58 y.o. female who presents for Follow-up (Pain in both hands. Pt also asking to up the prescription for the lancets and test strips due to pharmacist asking pt to test a few times a day.).    HPI   Tolerating mounjaro  Bs is much better now  No side effects  In pharmacy program'  Has not had  eye exam    Has pain both knuckles of index fingers.has stiffness.other joints ok.  Review of Systems  No fever chills night sweats  No gi bleeding or appetite loss  No PND or orthopnea  No headache or dizziness  Has chronic back pain  Had ablation Monday    Objective   /74   Pulse 86   Ht 1.676 m (5' 6\")   Wt 80.2 kg (176 lb 14.4 oz)   SpO2 96%   BMI 28.55 kg/m²     Physical Exam    Constitutional:       Appearance: Normal appearance.   HENT:      Head: Normocephalic.   Cardiovascular:      Rate and Rhythm: Normal rate and regular rhythm.   Pulmonary:      Effort: Pulmonary effort is normal.      Breath sounds: Normal breath sounds.   Abdominal:      General: Abdomen is flat. There is no distension.      Palpations: There is no mass.      Tenderness: There is no abdominal tenderness. There is no guarding or rebound.   Musculoskeletal:      Cervical back: Neck supple.  Tenderness MCP joints bilateral index fingers  Neurological:      Mental Status: She is alert.    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  Continue to check blood sugar daily.  Can do it fasting some days and 2 hours after meal on other days  Continue diet changes and exercise  Kidney function stable  Can try Voltaren gel for finger " pain.  Could be arthritis or tendinitis  Follow-up in 4 months

## 2024-11-22 PROCEDURE — RXMED WILLOW AMBULATORY MEDICATION CHARGE

## 2024-11-26 ENCOUNTER — PHARMACY VISIT (OUTPATIENT)
Dept: PHARMACY | Facility: CLINIC | Age: 59
End: 2024-11-26
Payer: COMMERCIAL

## 2024-12-11 PROCEDURE — RXMED WILLOW AMBULATORY MEDICATION CHARGE

## 2024-12-12 NOTE — PROGRESS NOTES
"Pharmacist Clinic: Diabetes   Follow Up Visit  Binta Mccann is a 59 y.o. female was referred to Clinical Pharmacy Team for diabetes management.     Referring Provider: Misti Contreras MD    Subjective   HPI  Known diabetes complications include  none   Endocrinology provider? No  Special needs/barriers to therapy: none     Diabetes Pharmacotherapy   Mounjaro 10 mg once weekly  Metformin  mg 2 tablets by mouth 2 times a day    Previous Diabetes Pharmacotherapy    Trulicity -- nausea/vomiting   Jardiance -- dehydrated all the time  Januvia -- not bringing down her A1c  Rybelsus 7 mg -- intolerance        Adverse Effects: Patient denies any GI adverse effects (Upset stomach/diarrhea/constipation/nausea/vomiting)  Adherence: Takes medication as directed and reports no missed doses  Affordability/Accessibility: no issues      Glucose Monitoring:  Patient is using glucometer; Truemetrix    Current BG Measurements:  FBG   113  142  122  136  121  124  134  124   Average FB mg/dL    Previous BG Measurements:  Average: 160 mg/dL      Hypoglycemia? Patient denies signs and symptoms and No BG readings < 80 mg/dL  Hyperglycemia? Denies signs and symptoms; Highest reading recently 145 mg/dL (only checks in mornings)      Diabetic Eye Exam: Up to date, completed     Foot Exam:  checks feet daily      Diet: in general, a \"healthy\" diet  , well balanced. Eats lots of meat and limited amount of vegetables.      Physical activity: no regular exercise; States once the weather becomes warmer, will walk a lot more   Updated 10/22: Has begun walking 1-2 times a week on the treadmill    Objective   Lab Review  Lab Results   Component Value Date    CALCIUM 9.7 2024    CO2 27 2024     2024    CREATININE 1.35 (H) 2024    GLUCOSE 225 (H) 2024    K 4.4 2024     2024    BUN 23 2024    ANIONGAP 14 2024     No results found for: \"TRIG\", \"CHOL\", \"LDLCALC\", " "\"HDL\"  POC HEMOGLOBIN A1c (%)   Date Value   11/20/2024 7.3 (A)   08/20/2024 7.8 (A)   05/20/2024 7.4 (A)     Hemoglobin A1C (%)   Date Value   06/07/2024 6.7 (H)     There is no height or weight on file to calculate BMI.  Wt Readings from Last 3 Encounters:   11/20/24 80.2 kg (176 lb 14.4 oz)   08/20/24 79.4 kg (175 lb)   05/20/24 84 kg (185 lb 1.6 oz)      Urine Albumin: n/a  The ASCVD Risk score (Teofilo SANTOS, et al., 2019) failed to calculate for the following reasons:    Cannot find a previous HDL lab    Cannot find a previous total cholesterol lab    Immunizations:  Influenza Vaccine: 2022  Pneumonia Vaccine: not documented    Drug Interactions  No significant drug-drug interactions exist that require change in therapy.    Assessment/Plan   Problem List Items Addressed This Visit       Diabetes mellitus type 2, uncomplicated (Multi)    Relevant Orders    Referral to Clinical Pharmacy           Patients diabetes is improving  with most recent A1c of 7.3% (goal < 7 %) from 7.8% in August. Patient's SMBG have continued to average in the 140s but has seen some in the 110s.  Patient has been tolerating Mounjaro 10 mg once weekly with some reported constipation.     Patient has continued to change up her diet and trying to limit greasy fried foods and sweets as she believes it is mainly her diet that is contributing to the elevated blood sugars. Counseled patient on being mindful of portion sizes. Patient has begun walking on the treadmill 1-2 times a week about 20-30 mins each time. Has a goal to work on lifestyle to be able to get off of all her diabetes medications. Explained that it may or may not be possible, may be able to at least get rid of metformin once we get to highest tolerated dose of Mounjaro.     Compliance at present is estimated to be good. Efforts to improve compliance (if necessary) will be directed at dietary modifications: increasing consumption of vegetables and limiting carbs " .      Plan:  CONTINUE  Metformin 500 mg XR BID  Mounjaro 10 mg once weekly      Future Considerations:  - Titrate Mounjaro dose to maximize glycemic control and to tolerance. Potentially discontinue metformin if A1c at goal (<7%) with Mounjaro.       Labs ordered: none     Follow up: 1/14/25 @ 1400  PCP Follow Up: 2/10/25    Provided contact info and encouraged patient to reach out with any concerns or questions regarding medications.        Aramis Ramsey, PharmD    Continue all meds under the continuation of care with the referring provider and clinical pharmacy team.

## 2024-12-16 ENCOUNTER — PHARMACY VISIT (OUTPATIENT)
Dept: PHARMACY | Facility: CLINIC | Age: 59
End: 2024-12-16
Payer: COMMERCIAL

## 2024-12-17 ENCOUNTER — APPOINTMENT (OUTPATIENT)
Dept: PHARMACY | Facility: HOSPITAL | Age: 59
End: 2024-12-17
Payer: MEDICARE

## 2024-12-17 DIAGNOSIS — E11.9 TYPE 2 DIABETES MELLITUS WITHOUT COMPLICATION, WITHOUT LONG-TERM CURRENT USE OF INSULIN (MULTI): ICD-10-CM

## 2024-12-19 PROCEDURE — RXMED WILLOW AMBULATORY MEDICATION CHARGE

## 2024-12-21 ENCOUNTER — PHARMACY VISIT (OUTPATIENT)
Dept: PHARMACY | Facility: CLINIC | Age: 59
End: 2024-12-21
Payer: COMMERCIAL

## 2024-12-21 PROCEDURE — RXMED WILLOW AMBULATORY MEDICATION CHARGE

## 2024-12-26 ENCOUNTER — PHARMACY VISIT (OUTPATIENT)
Dept: PHARMACY | Facility: CLINIC | Age: 59
End: 2024-12-26
Payer: COMMERCIAL

## 2025-01-09 NOTE — PROGRESS NOTES
"Pharmacist Clinic: Diabetes   Follow Up Visit  Binta Mccann is a 59 y.o. female was referred to Clinical Pharmacy Team for diabetes management.     Referring Provider: Misti Contreras MD    Subjective   HPI  Known diabetes complications include  none   Endocrinology provider? No  Special needs/barriers to therapy: none     Diabetes Pharmacotherapy   Mounjaro 10 mg once weekly  Metformin  mg BID   Previous Diabetes Pharmacotherapy    Trulicity -- nausea/vomiting   Jardiance -- dehydrated all the time  Januvia -- not bringing down her A1c  Rybelsus 7 mg -- intolerance        Adverse Effects: Patient denies any GI adverse effects (Upset stomach/diarrhea/constipation/nausea/vomiting)  Adherence: Takes medication as directed and reports no missed doses  Affordability/Accessibility: no issues      Glucose Monitoring:  Patient is using glucometer; Truemetrix    Current BG Measurements:  FBG   137  118  141  129  123  134  142  151  132     Previous BG Measurements:  Average FB mg/dL      Hypoglycemia? Patient denies signs and symptoms and No BG readings < 80 mg/dL  Hyperglycemia? Denies signs and symptoms; Highest reading recently 145 mg/dL (only checks in mornings)      Diabetic Eye Exam: Up to date, completed     Foot Exam:  checks feet daily      Diet: in general, a \"healthy\" diet  , well balanced. Eats lots of meat and limited amount of vegetables.      Physical activity: no regular exercise; States once the weather becomes warmer, will walk a lot more   Updated 10/22: Has begun walking 1-2 times a week on the treadmill    Objective   Lab Review  Lab Results   Component Value Date    CALCIUM 9.7 2024    CO2 27 2024     2024    CREATININE 1.35 (H) 2024    GLUCOSE 225 (H) 2024    K 4.4 2024     2024    BUN 23 2024    ANIONGAP 14 2024     No results found for: \"TRIG\", \"CHOL\", \"LDLCALC\", \"HDL\"  POC HEMOGLOBIN A1c (%)   Date Value "   11/20/2024 7.3 (A)   08/20/2024 7.8 (A)   05/20/2024 7.4 (A)     Hemoglobin A1C (%)   Date Value   06/07/2024 6.7 (H)     There is no height or weight on file to calculate BMI.  Wt Readings from Last 3 Encounters:   11/20/24 80.2 kg (176 lb 14.4 oz)   08/20/24 79.4 kg (175 lb)   05/20/24 84 kg (185 lb 1.6 oz)      Urine Albumin: n/a  The ASCVD Risk score (Teofilo SANTOS, et al., 2019) failed to calculate for the following reasons:    Cannot find a previous HDL lab    Cannot find a previous total cholesterol lab    Immunizations:  Influenza Vaccine: 2022  Pneumonia Vaccine: not documented    Drug Interactions  No significant drug-drug interactions exist that require change in therapy.    Assessment/Plan   Problem List Items Addressed This Visit       Diabetes mellitus type 2, uncomplicated (Multi)    Relevant Medications    tirzepatide (Mounjaro) 12.5 mg/0.5 mL pen injector    Other Relevant Orders    Referral to Clinical Pharmacy       Patients diabetes is improving  with most recent A1c of 7.3% (goal < 7 %) from 7.8% in August. Patient's SMBG have continued to average in the 140s but has seen some in the 110s. FBG remain slightly above goal of 130 mg/dL.  Patient has been tolerating Mounjaro 10 mg once weekly with some reported constipation. Plan to increase Mounjaro 10 mg to 12.5 mg to address elevated BG readings. Instructed patient to discontinue metformin with Mounjaro dose increase. Patient excited to discontinue metformin at this time.    Patient has continued to change up her diet and trying to limit greasy fried foods and sweets as she believes it is mainly her diet that is contributing to the elevated blood sugars. Counseled patient on being mindful of portion sizes. Patient is walking on the treadmill 1-2 times a week about 20-30 mins each time.     Compliance at present is estimated to be good. Efforts to improve compliance (if necessary) will be directed at dietary modifications: increasing consumption of  vegetables and limiting carbs .      Plan:  STOP  Metformin 500 mg XR BID (eGFR 45)    INCREASE  Mounjaro 10 mg to 12.5 mg once weekly    Future Considerations:  Titrate Mounjaro dose to maximize glycemic control and to tolerance.   Next A1c at next PCP visit (2/10/2025)    Labs ordered: none     Follow up: 2/11/25 @ 1400  PCP Follow Up: 2/10/25    Provided contact info and encouraged patient to reach out with any concerns or questions regarding medications.        Aramis Ramsey, PharmD    Continue all meds under the continuation of care with the referring provider and clinical pharmacy team.

## 2025-01-14 ENCOUNTER — APPOINTMENT (OUTPATIENT)
Dept: PHARMACY | Facility: HOSPITAL | Age: 60
End: 2025-01-14
Payer: MEDICARE

## 2025-01-14 DIAGNOSIS — E11.9 TYPE 2 DIABETES MELLITUS WITHOUT COMPLICATION, WITHOUT LONG-TERM CURRENT USE OF INSULIN (MULTI): ICD-10-CM

## 2025-01-14 PROCEDURE — RXMED WILLOW AMBULATORY MEDICATION CHARGE

## 2025-01-14 RX ORDER — TIRZEPATIDE 10 MG/.5ML
10 INJECTION, SOLUTION SUBCUTANEOUS WEEKLY
Qty: 2 ML | Refills: 1 | OUTPATIENT
Start: 2025-01-14

## 2025-01-14 RX ORDER — TIRZEPATIDE 12.5 MG/.5ML
12.5 INJECTION, SOLUTION SUBCUTANEOUS WEEKLY
Qty: 2 ML | Refills: 3 | Status: SHIPPED | OUTPATIENT
Start: 2025-01-14

## 2025-01-16 ENCOUNTER — PHARMACY VISIT (OUTPATIENT)
Dept: PHARMACY | Facility: CLINIC | Age: 60
End: 2025-01-16
Payer: COMMERCIAL

## 2025-01-16 ENCOUNTER — OFFICE VISIT (OUTPATIENT)
Dept: PRIMARY CARE | Facility: CLINIC | Age: 60
End: 2025-01-16
Payer: MEDICARE

## 2025-01-16 ENCOUNTER — HOSPITAL ENCOUNTER (OUTPATIENT)
Dept: RADIOLOGY | Facility: CLINIC | Age: 60
Discharge: HOME | End: 2025-01-16
Payer: MEDICARE

## 2025-01-16 VITALS
BODY MASS INDEX: 27.88 KG/M2 | OXYGEN SATURATION: 97 % | WEIGHT: 173.5 LBS | SYSTOLIC BLOOD PRESSURE: 108 MMHG | HEIGHT: 66 IN | HEART RATE: 81 BPM | DIASTOLIC BLOOD PRESSURE: 73 MMHG

## 2025-01-16 DIAGNOSIS — M75.01 ADHESIVE CAPSULITIS OF RIGHT SHOULDER: Primary | ICD-10-CM

## 2025-01-16 DIAGNOSIS — M75.01 ADHESIVE CAPSULITIS OF RIGHT SHOULDER: ICD-10-CM

## 2025-01-16 PROCEDURE — 3078F DIAST BP <80 MM HG: CPT | Performed by: INTERNAL MEDICINE

## 2025-01-16 PROCEDURE — 73030 X-RAY EXAM OF SHOULDER: CPT | Mod: RT

## 2025-01-16 PROCEDURE — 99213 OFFICE O/P EST LOW 20 MIN: CPT | Performed by: INTERNAL MEDICINE

## 2025-01-16 PROCEDURE — 3074F SYST BP LT 130 MM HG: CPT | Performed by: INTERNAL MEDICINE

## 2025-01-16 PROCEDURE — 3008F BODY MASS INDEX DOCD: CPT | Performed by: INTERNAL MEDICINE

## 2025-01-16 PROCEDURE — 1036F TOBACCO NON-USER: CPT | Performed by: INTERNAL MEDICINE

## 2025-01-16 PROCEDURE — 4010F ACE/ARB THERAPY RXD/TAKEN: CPT | Performed by: INTERNAL MEDICINE

## 2025-01-16 NOTE — PROGRESS NOTES
"Subjective   Patient ID: Binta Mccann is a 59 y.o. female who presents for Shoulder Pain.    HPI   Right shoulder pain for 2 months.Getting worse.radiates to arm.No injury  No neck pain  Pain is moderate to severe  Has been using pain medications and Biofreeze  Not improving      Review of Systems  No cough chest pain or shortness of breath  No nausea vomiting or abdominal pain  No headache or dizziness  Objective   /73   Pulse 81   Ht 1.676 m (5' 6\")   Wt 78.7 kg (173 lb 8 oz)   SpO2 97%   BMI 28.00 kg/m²     Physical Exam  Not in acute distress  No pallor or icterus  Neck without stiffness or lymphadenopathy  Chest is clear  CVS: S1-S2 regular  Examination of right shoulder reveals tenderness over the biceps.  Has difficulty elevating and abducting arm.  Shoulder movements restricted overall  Assessment/Plan   Problem List Items Addressed This Visit             ICD-10-CM    Adhesive capsulitis of right shoulder - Primary M75.01    Relevant Orders    XR shoulder right 2+ views    Referral to Physical Therapy     Likely frozen shoulder and also biceps tendinitis.  Will get an x-ray  Referral to therapy  Can see Ortho if therapy is not helpful  Continue heat Biofreeze and Advil as needed     "

## 2025-02-05 ENCOUNTER — EVALUATION (OUTPATIENT)
Dept: PHYSICAL THERAPY | Facility: CLINIC | Age: 60
End: 2025-02-05
Payer: MEDICARE

## 2025-02-05 DIAGNOSIS — M25.511 RIGHT SHOULDER PAIN, UNSPECIFIED CHRONICITY: Primary | ICD-10-CM

## 2025-02-05 DIAGNOSIS — M75.01 ADHESIVE CAPSULITIS OF RIGHT SHOULDER: ICD-10-CM

## 2025-02-05 PROCEDURE — 97161 PT EVAL LOW COMPLEX 20 MIN: CPT | Mod: GP

## 2025-02-05 PROCEDURE — 97110 THERAPEUTIC EXERCISES: CPT | Mod: GP

## 2025-02-05 NOTE — PROGRESS NOTES
PHYSICAL THERAPY EVALUATION AND TREATMENT    Patient Name: Binta Mccann  MRN: [id number]  Today's Date: 2/5/2025  Time Calculation  Start Time: 0930  Stop Time: 1015  Time Calculation (min): 45 min    Insurance:  Visit number: 1   Insurance Type: Payor: [employer] MEDICARE / Plan: [employer] GOLD CHOICE / Product Type: *No Product type* /   EVAL $114.01 // $35 COPAY VISITS ARE MED NEC NEEDS AUTH [place] PAYS % AFTER .00 80.43 APPLIED OOP 9350.00 90.43 APPLIED   Referred by: Misti Contreras MD     PT Evaluation Time Entry  PT Evaluation (Low) Time Entry: 30  PT Therapeutic Procedures Time Entry  Therapeutic Exercise Time Entry: 15                     Assessment:     Binta Mccann  is a 59 y.o. old patient who participated in a physical therapy evaluation today due to R shoulder pain that started a couple months ago, around the time she started lifting 5# weights. Pt presents with signs and symptoms consistent with bicep tendonopthy and possible RTC tendon involvement as well. her impairments include: tenderness, strength deficits, pain, ROM deficits.  Due to these impairments, she has the following functional limitations and participation restrictions: difficulty with sleeping, performing household/recreational/occupational activities, and performing some ADLS. Skilled physical therapy services are appropriate and beneficial in order to achieve measurable and meaningful change in the above objective tests and measures. Utilization of skilled physical therapy services will aid in advancing her functional status and attaining her therapy-related goals. The patient verbalized understanding and is in agreement with all goals and plan of care.  Plan of care was developed with input and agreement by the patient    Plan: Treatment/Interventions: Aquatic therapy, Blood flow restriction therapy, Cryotherapy, Dry needling, Education/ Instruction, Electrical stimulation, Gait training, Manual therapy, Neuromuscular  "re-education, Taping techniques, Therapeutic activities, Therapeutic exercises  PT Plan: Skilled PT  PT Frequency: Other (Comment) (1x every other week (per pt preference))  Duration: 12 weeks  Rehab Potential: Good  Plan of Care Agreement: Patient  NV: assess response to HEP, progress bicep tendon loading as tolerated, possibly add in shoulder ER tendon loading, trial of manual/DN      Therapy Diagnosis:     Problem List Items Addressed This Visit             ICD-10-CM    Right shoulder pain - Primary M25.511     Other Visit Diagnoses         Codes    Adhesive capsulitis of right shoulder     M75.01                 Subjective    Onset: a couple months ago  VIANNEY: none, woke up with pain in middle of night, she had started lifting 5# weights around this time   Pain location: R anterior shoulder, down anterior arm at times to forearm/wrist  Pain description: sharp  10/10 at worst; 0/10 at best  Worse with: sleeping on right side, sleeping on back, holding things such as coffee pot with that arm   Better with: laying on left side with pillow under R arm  Denies N/T     Prior treatments/interventions: biofreeze - NE     Home: no concern   PLOF: IND  CLOF: IND with pain  Functional limitations: lifting, carrying, sleep, dressing, hair  Pt's goal: relieve pain    Work: retired, receiving stock  Exercise: works out with nephews sometimes, treadmill  Hobbies: no    Diagnostic images:  Shoulder Xray 1/16/25  \"FINDINGS:  Right glenohumeral articulation demonstrates osteophytosis about the  glenoid rim. Humeral head contour maintained. No subcortical cystic  change or sclerosis identified. Acromioclavicular joint demonstrates  mild osteoarthritic degenerative change. Included portions visualized  right ribs are unremarkable. Acromioclavicular joint demonstrates  mild osteoarthritic degenerative change.    IMPRESSION:  1. Mild right glenohumeral joint osteoarthritis    2. No acute osseous abnormality about the right shoulder.\" "     Medical History Form: Reviewed (scanned into chart)    Precautions:  Fall Risk: None   +DM II, +HTN  Denies: CA, pacemaker, blood thinner medication use, latex allergy, epilepsy/seizures, or other known cardio/neuro/pulmonary problems     Previous surgeries include: none ortho related    Objective   Posture: rounded shoulders, increased thoracic kyphosis     Numbness/Tingling/Paresthesia: denies    Dermatomes B UE:  EC WNL to light touch bilat unless otherwise noted  Upper Trapezius (C4):  Lateral Deltoid (C5):  Tip of 1st Digit (C6):  Tip of 3rd Digit (C7):  Fifth Digit (C8):  Medial Upper Arm (T1):    Myotomes/Strength (MMT in sitting):  Shoulder Elevation (C4) R/L: 4+ / 4+   Shoulder Abduction (C5) R/L: 4+ * / 4+   Shoulder Flexion R/L: 4+ * / 4+   Shoulder Extension R/L:   Shoulder IR R/L: 4 / 4   Shoulder ER R/L: 4 * / 4   Elbow Flexion/Wrist Ext (C6) R/L: 4+ / 4+   Elbow Extension/Wrist Flexion (C7) R/L: 4/ 4+   Thumb Ext/Ulnar Deviation (C8) R/L: 4+ / 4+   Hand Intrinsics- abd/add (T1) R/L: 4+ / 4+     Range of Motion (AROM in sitting via goniometer):  Shoulder Flexion R/L: 110* limited by pain / 160 degrees   Shoulder Extension R/L: 40 degrees / 60 degrees   Shoulder Abduction R/L: 90 * limited by pain / 170 degrees   Shoulder IR R/L: in supine: at least to stomach   Shoulder ER R/L: in supine: 75 degrees / 75 degrees   Elbow Flexion R/L: WNL no pain / WNL no pain   Elbow Extension R/L: WNL no pain / WNL no pain     PROM in supine:  Flexion: 120 degrees, empty end feel due to pain   Abduction: 90 degrees, empty end feel due to pain     Palpation: TTP supra insertion, biceps insertion    Outcome Measures:  Other Measures  Disability of Arm Shoulder Hand (DASH): 41    Treatments:  Therapeutic Exercise  Access Code: [email or web address]  *mod verbal and tactile cues to avoid shoulder shrugging  - Seated scap retraction 5 second holds 2x10 - HEP   - Seated shoulder flexion slides 2x10- HEP   - Bicep tendon loading 45  second holds (50% submax) 1 min rest between reps 2x per day - HEP     EDUCATION:  Educated pt regarding benefit and purpose of skilled PT services along with results of examination findings and how this correlates to their chief complaint. Reviewed that I do not feel patient has adhesive capsulitis due to her available shoulder range of motion. Discussed more likely diagnosis of bicep and/or supraspinatus tendonopathy based on signs/symptoms. Discussed prolonged healing time for tendons. HEP updated. Ther ex cues provided along with rationale for interventions this date.     Goals:    Binta Mccann will report 75% improvement in sleep status in order to attain adequate rest.     Binta Watersa  will improve Quick DASH score by 8 points (minimal clinically important difference)  or <20% to indicate a significant improvement in overall function. Baseline 02/05/25:  41/55    Binta Watersa will demo 4+/5 RTC and deltoid strength (all planes) to allow for correct UE mechanics.    Binta Watersa will report 75% reduction in pain during ADLs to improve tolerance to household activities.    Binta Watersa will demo WFL/symmetrical shoulder ROM in all planes without pain to allow for correct mechanics with functional mobility.    Binta Watersa will demonstrate good posture with <2 cues for correction during 45 minute treatment session in order to enhance body mechanics with ADLs, functional mobility, and occupational duties.    Binta Watersa will report ability to lift >2 lbs overhead without pain to allow for work and ADLs without limitation.    Binta Watersa will demonstrate independence and report compliance with HEP to facilitate independent rehab program upon discharge.

## 2025-02-07 NOTE — PROGRESS NOTES
"Pharmacist Clinic: Diabetes   Follow Up Visit  Binta Mccann is a 59 y.o. female was referred to Clinical Pharmacy Team for diabetes management.     Referring Provider: Misti Contreras MD    Subjective   HPI  Known diabetes complications include  none   Endocrinology provider? No  Special needs/barriers to therapy: none     Diabetes Pharmacotherapy   Mounjaro 12.5 mg once weekly   Previous Diabetes Pharmacotherapy    Trulicity -- nausea/vomiting   Jardiance -- dehydrated all the time  Januvia -- not bringing down her A1c  Rybelsus 7 mg -- intolerance   Metformin  mg BID       Adverse Effects: Patient denies any GI adverse effects (Upset stomach/diarrhea/constipation/nausea/vomiting)  Adherence: Takes medication as directed and reports no missed doses  Affordability/Accessibility: no issues      Glucose Monitoring:  Patient is using glucometer; Truemetrix    Current BG Measurements: PPBG 170s, -160s    Previous BG Measurements:  Average FB mg/dL      Hypoglycemia? Patient denies signs and symptoms and No BG readings < 80 mg/dL  Hyperglycemia? Denies signs and symptoms; Highest reading recently 145 mg/dL (only checks in mornings)      Diabetic Eye Exam: Up to date, completed     Foot Exam:  checks feet daily      Diet: in general, a \"healthy\" diet  , well balanced. Eats lots of meat and limited amount of vegetables.      Physical activity: no regular exercise; States once the weather becomes warmer, will walk a lot more   Updated 10/22/24: Has begun walking 1-2 times a week on the treadmill    Objective   Lab Review  Lab Results   Component Value Date    CALCIUM 9.7 2024    CO2 27 2024     2024    CREATININE 1.35 (H) 2024    GLUCOSE 225 (H) 2024    K 4.4 2024     2024    BUN 23 2024    ANIONGAP 14 2024     No results found for: \"TRIG\", \"CHOL\", \"LDLCALC\", \"HDL\"  POC HEMOGLOBIN A1c (%)   Date Value   2024 7.3 (A) "   08/20/2024 7.8 (A)   05/20/2024 7.4 (A)     Hemoglobin A1C (%)   Date Value   06/07/2024 6.7 (H)     There is no height or weight on file to calculate BMI.  Wt Readings from Last 3 Encounters:   02/10/25 78.6 kg (173 lb 4.8 oz)   01/16/25 78.7 kg (173 lb 8 oz)   11/20/24 80.2 kg (176 lb 14.4 oz)      Urine Albumin: n/a  The ASCVD Risk score (Teofilo SANTOS, et al., 2019) failed to calculate for the following reasons:    Cannot find a previous HDL lab    Cannot find a previous total cholesterol lab    Immunizations:  Influenza Vaccine: 2022  Pneumonia Vaccine: not documented    Drug Interactions  No significant drug-drug interactions exist that require change in therapy.    Assessment/Plan   Problem List Items Addressed This Visit       Diabetes mellitus type 2, uncomplicated (Multi)    Relevant Orders    Referral to Clinical Pharmacy     Patients diabetes is improving  with most recent A1c of 7.3% (goal < 7 %) from 7.8% in August. Patient's SMBG have continued to average in the 130-170s . FBG remain slightly above goal of 130 mg/dL.  Patient has been tolerating Mounjaro 12.5 mg once weekly with some reported constipation. Plan to continue Mounjaro 12.5 mg.     Patient has continued to change up her diet and trying to limit greasy fried foods and sweets as she believes it is mainly her diet that is contributing to the elevated blood sugars. Counseled patient on being mindful of portion sizes. Patient is walking on the treadmill 1-2 times a week about 20-30 mins each time.     Compliance at present is estimated to be good. Efforts to improve compliance (if necessary) will be directed at dietary modifications: increasing consumption of vegetables and limiting carbs .    Plan:    CONTINUE  Mounjaro 12.5 mg once weekly    Future Considerations:  Titrate Mounjaro dose to maximize glycemic control and to tolerance. If patient is unable to lower FBG and reach A1c goal <7% then increase to Mounjaro 15 mg once weekly.  Next A1c   (2/20/2025)    Labs ordered: none     Follow up: 3/11/25 @ 1400  PCP Follow Up: 6/10/25    Provided contact info and encouraged patient to reach out with any concerns or questions regarding medications.        Aramis Ramsey, PharmD    Continue all meds under the continuation of care with the referring provider and clinical pharmacy team.

## 2025-02-10 ENCOUNTER — APPOINTMENT (OUTPATIENT)
Dept: PRIMARY CARE | Facility: CLINIC | Age: 60
End: 2025-02-10
Payer: MEDICARE

## 2025-02-10 VITALS
WEIGHT: 173.3 LBS | BODY MASS INDEX: 27.85 KG/M2 | DIASTOLIC BLOOD PRESSURE: 73 MMHG | HEIGHT: 66 IN | OXYGEN SATURATION: 95 % | SYSTOLIC BLOOD PRESSURE: 108 MMHG | HEART RATE: 71 BPM

## 2025-02-10 DIAGNOSIS — E11.9 TYPE 2 DIABETES MELLITUS WITHOUT COMPLICATION, WITHOUT LONG-TERM CURRENT USE OF INSULIN (MULTI): Primary | ICD-10-CM

## 2025-02-10 DIAGNOSIS — Z12.31 ENCOUNTER FOR SCREENING MAMMOGRAM FOR BREAST CANCER: ICD-10-CM

## 2025-02-10 DIAGNOSIS — I11.9 HYPERTENSIVE HEART DISEASE WITHOUT HEART FAILURE: ICD-10-CM

## 2025-02-10 DIAGNOSIS — N18.31 CHRONIC KIDNEY DISEASE, STAGE 3A (MULTI): ICD-10-CM

## 2025-02-10 DIAGNOSIS — E03.9 HYPOTHYROIDISM, UNSPECIFIED TYPE: ICD-10-CM

## 2025-02-10 PROCEDURE — 3074F SYST BP LT 130 MM HG: CPT | Performed by: INTERNAL MEDICINE

## 2025-02-10 PROCEDURE — 3008F BODY MASS INDEX DOCD: CPT | Performed by: INTERNAL MEDICINE

## 2025-02-10 PROCEDURE — 4010F ACE/ARB THERAPY RXD/TAKEN: CPT | Performed by: INTERNAL MEDICINE

## 2025-02-10 PROCEDURE — 1036F TOBACCO NON-USER: CPT | Performed by: INTERNAL MEDICINE

## 2025-02-10 PROCEDURE — 3078F DIAST BP <80 MM HG: CPT | Performed by: INTERNAL MEDICINE

## 2025-02-10 PROCEDURE — G2211 COMPLEX E/M VISIT ADD ON: HCPCS | Performed by: INTERNAL MEDICINE

## 2025-02-10 PROCEDURE — 99213 OFFICE O/P EST LOW 20 MIN: CPT | Performed by: INTERNAL MEDICINE

## 2025-02-10 ASSESSMENT — PATIENT HEALTH QUESTIONNAIRE - PHQ9
2. FEELING DOWN, DEPRESSED OR HOPELESS: NOT AT ALL
1. LITTLE INTEREST OR PLEASURE IN DOING THINGS: NOT AT ALL
SUM OF ALL RESPONSES TO PHQ9 QUESTIONS 1 AND 2: 0

## 2025-02-10 ASSESSMENT — ENCOUNTER SYMPTOMS: DEPRESSION: 0

## 2025-02-10 NOTE — PROGRESS NOTES
"Subjective   Patient ID: Binta Mccann is a 59 y.o. female who presents for Follow-up.    HPI   Mounjaro was increased recently and metformin stopped.  Most of blood sugar numbers are normal.  No hypoglycemia  Medicine tolerated  Has monthly appointment with pharmacy  Had eye exam    Sees cardiologist regularly.  Thyroid is also followed by cardiology    GERD symptoms controlled    Shoulder pain is still there.  Going for therapy.  No significant improvement  Review of Systems  No fever chills night sweats  No nausea vomiting or bowel changes  No anxiety or depression  Objective   /73   Pulse 71   Ht 1.676 m (5' 6\")   Wt 78.6 kg (173 lb 4.8 oz)   SpO2 95%   BMI 27.97 kg/m²     Physical Exam    Constitutional:       Appearance: Normal appearance.   HENT:      Head: Normocephalic.   Cardiovascular:      Rate and Rhythm: Normal rate and regular rhythm.   Pulmonary:      Effort: Pulmonary effort is normal.      Breath sounds: Normal breath sounds.   Abdominal:      General: Abdomen is flat. There is no distension.      Palpations: There is no mass.      Tenderness: There is no abdominal tenderness. There is no guarding or rebound.   Musculoskeletal:      Cervical back: Neck supple.   Neurological:      Mental Status: She is alert.    Assessment/Plan   Problem List Items Addressed This Visit             ICD-10-CM    Diabetes mellitus type 2, uncomplicated (Multi) - Primary E11.9    Relevant Orders    Hemoglobin A1C    Hypertensive cardiovascular disease I11.9    Hypothyroidism E03.9    Chronic kidney disease, stage 3a (Multi) N18.31    Relevant Orders    Renal function panel     Other Visit Diagnoses         Codes    Encounter for screening mammogram for breast cancer     Z12.31    Relevant Orders    BI mammo bilateral screening tomosynthesis          Blood sugar control improving based on home numbers.  A1c due after the 20th  Order given  Recheck renal function.  Off metformin  BP is controlled.  TSH " followed by cardiology    Screening mammogram ordered  Continue therapy for shoulder.  Can see Ortho if there is no improvement  Follow-up in 4 months

## 2025-02-11 ENCOUNTER — APPOINTMENT (OUTPATIENT)
Dept: PHARMACY | Facility: HOSPITAL | Age: 60
End: 2025-02-11
Payer: MEDICARE

## 2025-02-11 DIAGNOSIS — E11.9 TYPE 2 DIABETES MELLITUS WITHOUT COMPLICATION, WITHOUT LONG-TERM CURRENT USE OF INSULIN (MULTI): ICD-10-CM

## 2025-02-13 PROCEDURE — RXMED WILLOW AMBULATORY MEDICATION CHARGE

## 2025-02-14 ENCOUNTER — PHARMACY VISIT (OUTPATIENT)
Dept: PHARMACY | Facility: CLINIC | Age: 60
End: 2025-02-14
Payer: COMMERCIAL

## 2025-02-25 LAB
ALBUMIN SERPL-MCNC: 4.3 G/DL (ref 3.6–5.1)
BUN SERPL-MCNC: 27 MG/DL (ref 7–25)
BUN/CREAT SERPL: 19 (CALC) (ref 6–22)
CALCIUM SERPL-MCNC: 9.4 MG/DL (ref 8.6–10.4)
CHLORIDE SERPL-SCNC: 108 MMOL/L (ref 98–110)
CO2 SERPL-SCNC: 25 MMOL/L (ref 20–32)
CREAT SERPL-MCNC: 1.39 MG/DL (ref 0.5–1.05)
EGFRCR SERPLBLD CKD-EPI 2021: 43 ML/MIN/1.73M2
EST. AVERAGE GLUCOSE BLD GHB EST-MCNC: 148 MG/DL
EST. AVERAGE GLUCOSE BLD GHB EST-SCNC: 8.2 MMOL/L
GLUCOSE SERPL-MCNC: 149 MG/DL (ref 65–139)
HBA1C MFR BLD: 6.8 % OF TOTAL HGB
PHOSPHATE SERPL-MCNC: 2.9 MG/DL (ref 2.5–4.5)
POTASSIUM SERPL-SCNC: 4.4 MMOL/L (ref 3.5–5.3)
SODIUM SERPL-SCNC: 143 MMOL/L (ref 135–146)

## 2025-03-10 NOTE — PROGRESS NOTES
"Pharmacist Clinic: Diabetes   Follow Up Visit  iBnta Mccann is a 59 y.o. female was referred to Clinical Pharmacy Team for diabetes management.     Referring Provider: Misti Contreras MD    Subjective   HPI  Known diabetes complications include  none   Endocrinology provider? No  Special needs/barriers to therapy: none     Diabetes Pharmacotherapy   Mounjaro 12.5 mg once weekly   Previous Diabetes Pharmacotherapy    Trulicity -- nausea/vomiting   Jardiance -- dehydrated all the time  Januvia -- not bringing down her A1c  Rybelsus 7 mg -- intolerance   Metformin  mg BID       Adverse Effects: Patient denies any GI adverse effects (Upset stomach/diarrhea/constipation/nausea/vomiting)  Adherence: Takes medication as directed and reports no missed doses  Affordability/Accessibility: no issues      Glucose Monitoring:  Patient is using glucometer; Truemetrix    Current BG Measurements: PPBG 170s, -160s  145 148 133 135 , 166, 129, 155, 119    Previous BG Measurements:  Average FB mg/dL      Hypoglycemia? Patient denies signs and symptoms and No BG readings < 80 mg/dL  Hyperglycemia? Denies signs and symptoms; Highest reading recently 145 mg/dL (only checks in mornings)      Diabetic Eye Exam: Up to date, completed     Foot Exam:  checks feet daily      Diet: in general, a \"healthy\" diet  , well balanced. Eats lots of meat and limited amount of vegetables.      Physical activity: no regular exercise; States once the weather becomes warmer, will walk a lot more   Updated 10/22/24: Has begun walking 1-2 times a week on the treadmill    Objective   Lab Review  Lab Results   Component Value Date    CALCIUM 9.4 2025    CO2 25 2025     2025    CREATININE 1.39 (H) 2025    GLUCOSE 149 (H) 2025    K 4.4 2025     2025    BUN 27 (H) 2025    ANIONGAP 14 2024    PHOS 2.9 2025    ALBUMIN 4.3 2025     No results found for: \"TRIG\", " "\"CHOL\", \"LDLCALC\", \"HDL\"  POC HEMOGLOBIN A1c (%)   Date Value   11/20/2024 7.3 (A)   08/20/2024 7.8 (A)   05/20/2024 7.4 (A)     HEMOGLOBIN A1c (% of total Hgb)   Date Value   02/24/2025 6.8 (H)     Hemoglobin A1C (%)   Date Value   06/07/2024 6.7 (H)     There is no height or weight on file to calculate BMI.  Wt Readings from Last 3 Encounters:   02/10/25 78.6 kg (173 lb 4.8 oz)   01/16/25 78.7 kg (173 lb 8 oz)   11/20/24 80.2 kg (176 lb 14.4 oz)      Urine Albumin: 9.1  The ASCVD Risk score (Teofilo SANTOS, et al., 2019) failed to calculate for the following reasons:    Cannot find a previous HDL lab    Cannot find a previous total cholesterol lab    Drug Interactions  No significant drug-drug interactions exist that require change in therapy.    Assessment/Plan   Problem List Items Addressed This Visit       Diabetes mellitus type 2, uncomplicated (Multi)    Relevant Medications    tirzepatide (Mounjaro) 15 mg/0.5 mL pen injector    Other Relevant Orders    Referral to Clinical Pharmacy     Patients diabetes is improving  with most recent A1c of 6.8% (goal < 7 %) from 7.3% in August. Patient's SMBG have continued to average in the 120-160s. Patient has been tolerating Mounjaro 12.5 mg once weekly. Plan to increase Mounjaro from 12.5 mg to 15 mg once weekly to improve glycemic control as BG readings continue to be above goal.      Patient has continued to change up her diet and trying to limit greasy fried foods and sweets as she believes it is mainly her diet that is contributing to the elevated blood sugars. Counseled patient on being mindful of portion sizes. Patient is walking on the treadmill 1-2 times a week about 20-30 mins each time. Encouraged patient to take note of which food increases BG readings compared to others.    Compliance at present is estimated to be good. Efforts to improve compliance (if necessary) will be directed at dietary modifications: increasing consumption of vegetables and limiting carbs " .    Plan:  INCREASE  Mounjaro 12.5 mg to 15 mg once weekly    Future Considerations:  Titrate Mounjaro dose to maximize glycemic control and to tolerance. If patient is unable to lower FBG and reach A1c goal <7% then increase to Mounjaro 15 mg once weekly.  Next A1c  (2/20/2025)    Labs ordered: none     Follow up: 4/1/25 @ 1400  PCP Follow Up: 6/10/25    Provided contact info and encouraged patient to reach out with any concerns or questions regarding medications.      Aramis Ramsey, PharmD    Continue all meds under the continuation of care with the referring provider and clinical pharmacy team.

## 2025-03-11 ENCOUNTER — APPOINTMENT (OUTPATIENT)
Dept: PHARMACY | Facility: HOSPITAL | Age: 60
End: 2025-03-11
Payer: MEDICARE

## 2025-03-11 DIAGNOSIS — E11.9 TYPE 2 DIABETES MELLITUS WITHOUT COMPLICATION, WITHOUT LONG-TERM CURRENT USE OF INSULIN (MULTI): ICD-10-CM

## 2025-03-11 PROCEDURE — RXMED WILLOW AMBULATORY MEDICATION CHARGE

## 2025-03-11 RX ORDER — TIRZEPATIDE 15 MG/.5ML
15 INJECTION, SOLUTION SUBCUTANEOUS WEEKLY
Qty: 2 ML | Refills: 11 | Status: SHIPPED | OUTPATIENT
Start: 2025-03-11

## 2025-03-12 ENCOUNTER — TREATMENT (OUTPATIENT)
Dept: PHYSICAL THERAPY | Facility: CLINIC | Age: 60
End: 2025-03-12
Payer: MEDICARE

## 2025-03-12 DIAGNOSIS — M25.511 RIGHT SHOULDER PAIN, UNSPECIFIED CHRONICITY: Primary | ICD-10-CM

## 2025-03-12 PROCEDURE — 97140 MANUAL THERAPY 1/> REGIONS: CPT | Mod: GP

## 2025-03-12 PROCEDURE — 97110 THERAPEUTIC EXERCISES: CPT | Mod: GP

## 2025-03-12 NOTE — PROGRESS NOTES
PHYSICAL THERAPY TREATMENT    Patient Name: Binta Mccann  MRN: [id number]  Today's Date: 3/12/2025  Time Calculation  Start Time: 1042  Stop Time: 1131  Time Calculation (min): 49 min    Insurance:  Visit number: 2   Insurance Type: Payor: [employer] MEDICARE / Plan: HU[employer] GOLD CHOICE / Product Type: *No Product type* /   EVAL $114.01 // $35 COPAY VISITS ARE MED NEC NEEDS AUTH [place] PAYS % AFTER .00 80.43 APPLIED OOP 9350.00 90.43 APPLIED   *PT AUTH 8V 2/5-4/25/25**   Referred by: Misti Contreras MD       Therapy Diagnosis:     Problem List Items Addressed This Visit             ICD-10-CM    Right shoulder pain - Primary M25.511     Subjective    R anterior shoulder pain 7/10 at rest, worse with elevation. Has days of being pain-free and some days pain is very severe. No inciting events make it worse. Pain continues to wake her up at night. Some times feels better for an hour after doing HEP. She plans on scheduling an appointment with pain management.     HEP compliance: yes       Medical History Form: Reviewed (scanned into chart)    Precautions:  Fall Risk: None   +DM II, +HTN  Denies: CA, pacemaker, blood thinner medication use, latex allergy, epilepsy/seizures, or other known cardio/neuro/pulmonary problems     Previous surgeries include: none ortho related    Objective   AAROM flexion in supine with SPC: 125 degrees    Treatments:  Therapeutic Exercise  Access Code: [email or web address]  *mod verbal and tactile cues to avoid shoulder shrugging  - supine shoulder AAROM with opp hand - too guarded  - shoulder flexion AAROM with SPC - best range vs. PROM/opp hand   - supine serratus punch x10   - supine B shoulder ER YTB 2x10 - HEP   - seated scap retraction YTB, min cues to avoid UT comp - HEP  - standing bicep curl 5#x10- too challenging  - standing bicep curl 2# (pt has 2# @ home) - 3x10 - HEP, progressed to phase II tendon loading       - Seated shoulder flexion slides 2x10- HEP - NP      Manual Therapy:    Attempted supine shoulder flexion PROM, pt unable to relax/extremely guarded; full ER PROM    Supine STM R anterior/lateral shoulder and bicep     EDUCATION: HEP updated. Ther ex cues provided along with rationale for interventions this date.     Assessment:  Attempted PROM to improve R shoulder flexion, pt unable to keep shoulder relaxed enough to keep passive despite multiple attempts. She tolerated AAROM with SPC best - still limited to about 125 degrees. Able to progress bicep tendon loading from phase 1 isometric to phase 2 concentric contractions. No increase in pain reported following. No change in pain end of session, when donning coat. Pt will continue to benefit from skilled PT services to decrease pain and improve function.     Plan:    NV: assess response to HEP, progress bicep tendon loading as tolerated, possibly add in shoulder ER tendon loading, trial of manual/DN    Time Calculation  Start Time: 1042  Stop Time: 1131  Time Calculation (min): 49 min     PT Therapeutic Procedures Time Entry  Manual Therapy Time Entry: 24  Therapeutic Exercise Time Entry: 25

## 2025-03-13 ENCOUNTER — PHARMACY VISIT (OUTPATIENT)
Dept: PHARMACY | Facility: CLINIC | Age: 60
End: 2025-03-13
Payer: COMMERCIAL

## 2025-03-14 ENCOUNTER — PHARMACY VISIT (OUTPATIENT)
Dept: PHARMACY | Facility: CLINIC | Age: 60
End: 2025-03-14
Payer: COMMERCIAL

## 2025-03-31 ENCOUNTER — TREATMENT (OUTPATIENT)
Dept: PHYSICAL THERAPY | Facility: CLINIC | Age: 60
End: 2025-03-31
Payer: MEDICARE

## 2025-03-31 DIAGNOSIS — M25.511 RIGHT SHOULDER PAIN, UNSPECIFIED CHRONICITY: Primary | ICD-10-CM

## 2025-03-31 PROCEDURE — 97110 THERAPEUTIC EXERCISES: CPT | Mod: GP

## 2025-03-31 PROCEDURE — 97530 THERAPEUTIC ACTIVITIES: CPT | Mod: GP

## 2025-03-31 PROCEDURE — RXMED WILLOW AMBULATORY MEDICATION CHARGE

## 2025-03-31 NOTE — PROGRESS NOTES
PHYSICAL THERAPY TREATMENT    Patient Name: Binta Mccann  MRN: [id number]  Today's Date: 3/31/2025  Time Calculation  Start Time: 1515  Stop Time: 1553  Time Calculation (min): 38 min    Insurance:  Visit number: 3   Insurance Type: Payor: HU[employer] MEDICARE / Plan: HU[employer]NA GOLD CHOICE / Product Type: *No Product type* /   EVAL $114.01 // $35 COPAY VISITS ARE MED NEC NEEDS AUTH [place] PAYS % AFTER .00 80.43 APPLIED OOP 9350.00 90.43 APPLIED   *PT AUTH 8V 2/5-4/25/25**   Referred by: Misti Contreras MD       Therapy Diagnosis:     Problem List Items Addressed This Visit             ICD-10-CM    Right shoulder pain - Primary M25.511     Subjective    States shoulder is feeling better. Has some discomfort sometimes.   She is trying to avoid stretching it overhead because she does not want to re-injure it.   Pain not waking her at night frequently anymore.   No pain at rest, 3/10 pain in anterior shoulder with elevation.     HEP compliance: yes     Medical History Form: Reviewed (scanned into chart)    Precautions:  Fall Risk: None   +DM II, +HTN  Denies: CA, pacemaker, blood thinner medication use, latex allergy, epilepsy/seizures, or other known cardio/neuro/pulmonary problems     Previous surgeries include: none ortho related    Objective   Range of Motion (AROM in sitting via goniometer):  Shoulder Flexion R/L: 110* limited by pain / 160 degrees >> 165 degrees * minimal pain   Shoulder Extension R/L: 40 degrees / 60 degrees  >> 45 degrees   Shoulder Abduction R/L: 90 * limited by pain / 170 degrees  >> 135 degrees * minimal pain    Outcome Measures:  Other Measures  Disability of Arm Shoulder Hand (DASH): 41 Other Measures  Disability of Arm Shoulder Hand (DASH): 16     Treatments:  Therapeutic Activity:   Assessment of pt's POC goals completed today- see objective, goals, and assessment section. Educated pt regarding results of examination findings and discussed next steps in POC progression. Educated  pt regarding importance of continuing with good HEP compliance for optimal rehab results.     Therapeutic Exercise  Access Code: [email or web address]  *mod verbal and tactile cues to avoid shoulder shrugging  - standing scap retraction GTB, min cues to avoid UT comp - HEP   - standing shldr ext RTB - HEP  - standing shldr ER YTB - HEP  - standing shldr IR RTB - HEP   - standing shldr flexion slides up wall - HEP   - standing bicep curl 5#  3x10 - HEP,  phase II tendon loading     EDUCATION: HEP updated. Ther ex cues provided along with rationale for interventions this date.     Assessment:  - Binta Mccann has completed 3 sessions of physical therapy treatment with emphasis upon education, ROM, strength, and manual techniques for pain relief. she demonstrates improved R UE strength and ROM and has greatly improved her QDASH scores. her score on these outcome measures reflect that her functional mobility, independence, and quality of life has improved.   - Currently, Binta Mccann has met or nearly met all established PT POC goals at this time and is able to continue with her HEP IND for further progress/management. Pt requests her chart be on hold until end of her insurance authorization in case pain returns. If no contact by 4/25/25, Binta Mccann to be discharged from PT services and back to care under referring physician. Binta Mccann voiced agreement and satisfaction with this plan.     Plan: chart on hold until end of auth on 4/25/25. If no contact by this date, pt discharged. Pt voices satisfaction and agreement with this plan.     Goals:     Binta Mccann will report 75% improvement in sleep status in order to attain adequate rest. 03/31/25 GOAL MET      Binta Mccann  will improve Quick DASH score by 8 points (minimal clinically important difference)  or <20% to indicate a significant improvement in overall function. Baseline 02/05/25:  41/55 >> 03/31/25 GOAL MET      Binta Mccann will demo 4+/5 RTC and  deltoid strength (all planes) to allow for correct UE mechanics. 03/31/25 GOAL MET      Binta Watersa will report 75% reduction in pain during ADLs to improve tolerance to household activities. 03/31/25 GOAL MET      Binta Wilkinsvsa will demo WFL/symmetrical shoulder ROM in all planes without pain to allow for correct mechanics with functional mobility. 03/31/25 GOAL ALMOST MET     Binta Watersa will demonstrate good posture with <2 cues for correction during 45 minute treatment session in order to enhance body mechanics with ADLs, functional mobility, and occupational duties. 03/31/25 GOAL PROGRESSING     Binta Watersa will report ability to lift >2 lbs overhead without pain to allow for work and ADLs without limitation. 03/31/25 GOAL MET      Binta Watersa will demonstrate independence and report compliance with HEP to facilitate independent rehab program upon discharge. 03/31/25 GOAL MET        Time Calculation  Start Time: 1515  Stop Time: 1553  Time Calculation (min): 38 min     PT Therapeutic Procedures Time Entry  Therapeutic Exercise Time Entry: 20  Therapeutic Activity Time Entry: 18

## 2025-04-01 ENCOUNTER — APPOINTMENT (OUTPATIENT)
Dept: PHARMACY | Facility: HOSPITAL | Age: 60
End: 2025-04-01
Payer: MEDICARE

## 2025-04-01 DIAGNOSIS — E11.9 TYPE 2 DIABETES MELLITUS WITHOUT COMPLICATION, WITHOUT LONG-TERM CURRENT USE OF INSULIN: ICD-10-CM

## 2025-04-01 NOTE — ASSESSMENT & PLAN NOTE
Patients diabetes is improving with most recent A1c of 6.8% (goal < 7 %) from 7.3% in August. Patient's SMBG have continued to average in the 130-160s; notes a few elevated readings however most readings are stable. Patient has been tolerating Mounjaro 15 mg once weekly. Plan to continue with current dose of Mounjaro and advised patient to work on lifestyle changes as discussed.      Patient has continued to change up her diet and trying to limit greasy fried foods and sweets as she believes it is mainly her diet that is contributing to the elevated blood sugars. Counseled patient on being mindful of portion sizes. She has been tracking which foods lead to higher readings.     Patient inotes that due to being busy with cleaning out her garage she has not been on the treadmill since last visit. Encouraged patient to restart when she is able, even if it is only for a few times each week for short 20-30 minute sessions.      Compliance at present is estimated to be good. Efforts to improve compliance (if necessary) will be directed at dietary modifications: increasing consumption of vegetables and limiting carbs, as well as increasing physical activity.      Plan:  CONTINUE:  [id number] mg once weekly     Future Considerations:  Next A1c expected around end of May

## 2025-04-01 NOTE — PROGRESS NOTES
"  Clinical Pharmacy Appointment    Patient ID: Binta Mccann is a 59 y.o. female who presents for Diabetes.    Referring Provider: Misti Contreras MD     Subjective   HPI  Known diabetes complications include: none   Endocrinology provider? No  Special needs/barriers to therapy: none     Lifestyle Changes:  Current diet: in general, a \"healthy\" diet  , well balanced; eats lots of meat and limited amount of vegetables  Current exercise: walking on treadmill 1-2x/week for 20-30 minutes each session  Notes she will increase physical activity once the weather warms up    Glucose Monitoring Regimen:  Patient is using glucometer; Truemetrix    Reported SMBG Measurements:  On average, between 130-160s  FB, 126, 141, 125, 133, 163  PPB, 180, 165, 170, 155, 153, 166, 165, 155    Any episodes of hypoglycemia? no    Adverse Effects:   Patient denies any GI adverse effects (Upset stomach/diarrhea/constipation/nausea/vomiting)    Adherence: Takes medication as directed and reports no missed doses   Affordability/Accessibility: no issues    Diabetes Pharmacotherapy:    Mounjaro 15 mg once weekly    Historical Diabetes Pharmacotherapy:  Metformin XR -- renal function  Trulicity -- nausea/vomiting   Jardiance -- dehydrated all the time  Januvia -- not bringing down her A1c  Rybelsus -- intolerance     Secondary Prevention  Statin? Yes (Rosuvastatin 40 mg)  ACE-I/ARB? Yes (Losartan 50 mg)  Aspirin? No    Pertinent PMH Review  PMH of Pancreatitis: No  PMH of Retinopathy: No  PMH of Recurrent Urinary Tract Infections: No  PMH of Medullary Thyroid Cancer (MTC): No  PMH of Multiple Endocrine Neoplasia (MEN) Type II: No    Health Maintenance:   Foot Exam: checks feet daily  Eye Exam: last done   Lipid Panel: LDL 41 mg/dL (2024)   UACR: 9.1 micrograms/mg (10/2024)    Drug Interactions: No significant drug-drug interactions exist that require change in therapy.    Objective     There were no vitals taken for this " "visit.     Labs  Lab Results   Component Value Date    CALCIUM 9.4 02/24/2025    CO2 25 02/24/2025     02/24/2025    CREATININE 1.39 (H) 02/24/2025    GLUCOSE 149 (H) 02/24/2025    K 4.4 02/24/2025     02/24/2025    BUN 27 (H) 02/24/2025    ANIONGAP 14 09/30/2024    PHOS 2.9 02/24/2025    ALBUMIN 4.3 02/24/2025     No results found for: \"TRIG\", \"CHOL\", \"LDLCALC\", \"HDL\"  Lab Results   Component Value Date    HGBA1C 6.8 (H) 02/24/2025       Current Outpatient Medications on File Prior to Visit   Medication Sig Dispense Refill    alcohol swabs pads, medicated Use to test blood sugars once daily. 100 each 11    blood sugar diagnostic (Blood Glucose Test) strip Use to test blood sugars once daily. 100 strip 11    blood-glucose meter misc Use to test blood sugars once daily. 1 each 0    clotrimazole-betamethasone (Lotrisone) cream Apply topically twice a day.      Dexcom G7  misc Use as instructed 1 each 0    Dexcom G7 Sensor device Change every 10 days 3 each 3    diclofenac sodium (Voltaren) 1 % gel Apply half inch to both fingers 4 times a jimbo 100 g 0    fenofibrate (Tricor) 145 mg tablet Take 1 tablet (145 mg) by mouth once daily.      L. acidophilus/Bifid. animalis 32 billion cell capsule Take by mouth.      lancets misc Use to test blood sugars once daily. 100 each 11    levothyroxine (Synthroid, Levoxyl) 125 mcg tablet Take 1 tablet (125 mcg) by mouth once daily in the morning. Take before meals.      losartan (Cozaar) 50 mg tablet Take 1 tablet (50 mg) by mouth once daily.      metoprolol succinate XL (Toprol-XL) 25 mg 24 hr tablet Take 1 tablet (25 mg) by mouth once daily.      omeprazole (PriLOSEC) 20 mg DR capsule Take 1 capsule (20 mg) by mouth once daily. 30 capsule 1    pregabalin (Lyrica) 100 mg capsule Take 1 capsule (100 mg) by mouth.      rosuvastatin (Crestor) 40 mg tablet Take 1 tablet (40 mg) by mouth once daily.      tirzepatide (Mounjaro) 15 mg/0.5 mL pen injector Inject 15 mg " under the skin 1 (one) time per week. 2 mL 11    triamcinolone (Kenalog) 0.1 % cream Apply topically 2 times a day.       No current facility-administered medications on file prior to visit.        Assessment/Plan   Problem List Items Addressed This Visit             ICD-10-CM    Diabetes mellitus type 2, uncomplicated E11.9     Patients diabetes is improving with most recent A1c of 6.8% (goal < 7 %) from 7.3% in August. Patient's SMBG have continued to average in the 130-160s; notes a few elevated readings however most readings are stable. Patient has been tolerating Mounjaro 15 mg once weekly. Plan to continue with current dose of Mounjaro and advised patient to work on lifestyle changes as discussed.      Patient has continued to change up her diet and trying to limit greasy fried foods and sweets as she believes it is mainly her diet that is contributing to the elevated blood sugars. Counseled patient on being mindful of portion sizes. She has been tracking which foods lead to higher readings.     Patient inotes that due to being busy with cleaning out her garage she has not been on the treadmill since last visit. Encouraged patient to restart when she is able, even if it is only for a few times each week for short 20-30 minute sessions.      Compliance at present is estimated to be good. Efforts to improve compliance (if necessary) will be directed at dietary modifications: increasing consumption of vegetables and limiting carbs, as well as increasing physical activity.      Plan:  CONTINUE:  [id number] mg once weekly     Future Considerations:  Next A1c expected around end of May          Zara Carlos PharmD  Clinical Ambulatory Care Pharmacist  Ph: [phone]    Continue all meds under the continuation of care with the referring provider and clinical pharmacy team.    Clinical Pharmacist follow up: 5/6/25 @ 2 PM or sooner as needed based on clinical intervention  Type of Encounter:  Telephone    Verbal  consent to manage patient's drug therapy was obtained from the patient. They were informed they may decline to participate or withdraw from participation in pharmacy services at any time.

## 2025-04-03 ENCOUNTER — PHARMACY VISIT (OUTPATIENT)
Dept: PHARMACY | Facility: CLINIC | Age: 60
End: 2025-04-03
Payer: COMMERCIAL

## 2025-04-09 PROCEDURE — RXMED WILLOW AMBULATORY MEDICATION CHARGE

## 2025-04-10 ENCOUNTER — PHARMACY VISIT (OUTPATIENT)
Dept: PHARMACY | Facility: CLINIC | Age: 60
End: 2025-04-10
Payer: COMMERCIAL

## 2025-05-05 NOTE — PROGRESS NOTES
"  Clinical Pharmacy Appointment    Patient ID: Binta Mccann is a 59 y.o. female who presents for Diabetes.    Referring Provider: Misti Contreras MD     Subjective   HPI  Known diabetes complications include: none   Endocrinology provider? No  Special needs/barriers to therapy: none     Lifestyle Changes:  Current diet: in general, a \"healthy\" diet  , well balanced; eats lots of meat and limited amount of vegetables  Current exercise: walking on treadmill 1-2x/week for 20-30 minutes each session  Notes she will increase physical activity once the weather warms up    Glucose Monitoring Regimen:  Patient is using glucometer; Truemetrix    Reported SMBG Measurements:  On average, between 130-150s  FBG: few 180-190s  FB, 126, 141, 125, 133, 163  PPB, 180, 165, 170, 155, 153, 166, 165, 155    Any episodes of hypoglycemia? no    Adverse Effects:   Patient denies any GI adverse effects (Upset stomach/diarrhea/constipation/nausea/vomiting)    Adherence: Takes medication as directed and reports no missed doses   Affordability/Accessibility: no issues    Diabetes Pharmacotherapy:    Mounjaro 15 mg once weekly    Historical Diabetes Pharmacotherapy:  Metformin XR -- renal function  Trulicity -- nausea/vomiting   Jardiance -- dehydrated all the time  Januvia -- not bringing down her A1c  Rybelsus -- intolerance     Secondary Prevention  Statin? Yes (Rosuvastatin 40 mg)  ACE-I/ARB? Yes (Losartan 50 mg)  Aspirin? No    Pertinent PMH Review  PMH of Pancreatitis: No  PMH of Retinopathy: No  PMH of Recurrent Urinary Tract Infections: No  PMH of Medullary Thyroid Cancer (MTC): No  PMH of Multiple Endocrine Neoplasia (MEN) Type II: No    Health Maintenance:   Foot Exam: checks feet daily  Eye Exam: last done   Lipid Panel: LDL 41 mg/dL (2024)   UACR: 9.1 micrograms/mg (10/2024)    Drug Interactions: No significant drug-drug interactions exist that require change in therapy.    Objective     There were no vitals " "taken for this visit.     Labs  Lab Results   Component Value Date    CALCIUM 9.4 02/24/2025    CO2 25 02/24/2025     02/24/2025    CREATININE 1.39 (H) 02/24/2025    GLUCOSE 149 (H) 02/24/2025    K 4.4 02/24/2025     02/24/2025    BUN 27 (H) 02/24/2025    ANIONGAP 14 09/30/2024    PHOS 2.9 02/24/2025    ALBUMIN 4.3 02/24/2025     No results found for: \"TRIG\", \"CHOL\", \"LDLCALC\", \"HDL\"  Lab Results   Component Value Date    HGBA1C 6.8 (H) 02/24/2025       Current Outpatient Medications on File Prior to Visit   Medication Sig Dispense Refill    alcohol swabs pads, medicated Use to test blood sugars once daily. 100 each 11    blood sugar diagnostic (Blood Glucose Test) strip Use to test blood sugars once daily. 100 strip 11    blood-glucose meter misc Use to test blood sugars once daily. 1 each 0    clotrimazole-betamethasone (Lotrisone) cream Apply topically twice a day.      Dexcom G7  misc Use as instructed 1 each 0    Dexcom G7 Sensor device Change every 10 days 3 each 3    diclofenac sodium (Voltaren) 1 % gel Apply half inch to both fingers 4 times a jimbo 100 g 0    fenofibrate (Tricor) 145 mg tablet Take 1 tablet (145 mg) by mouth once daily.      L. acidophilus/Bifid. animalis 32 billion cell capsule Take by mouth.      lancets misc Use to test blood sugars once daily. 100 each 11    levothyroxine (Synthroid, Levoxyl) 125 mcg tablet Take 1 tablet (125 mcg) by mouth once daily in the morning. Take before meals.      losartan (Cozaar) 50 mg tablet Take 1 tablet (50 mg) by mouth once daily.      metoprolol succinate XL (Toprol-XL) 25 mg 24 hr tablet Take 1 tablet (25 mg) by mouth once daily.      omeprazole (PriLOSEC) 20 mg DR capsule Take 1 capsule (20 mg) by mouth once daily. 30 capsule 1    pregabalin (Lyrica) 100 mg capsule Take 1 capsule (100 mg) by mouth.      rosuvastatin (Crestor) 40 mg tablet Take 1 tablet (40 mg) by mouth once daily.      tirzepatide (Mounjaro) 15 mg/0.5 mL pen injector " Inject 15 mg under the skin 1 (one) time per week. 2 mL 11    triamcinolone (Kenalog) 0.1 % cream Apply topically 2 times a day.       No current facility-administered medications on file prior to visit.        Assessment/Plan   Problem List Items Addressed This Visit           ICD-10-CM    Diabetes mellitus type 2, uncomplicated E11.9    Relevant Orders    Referral to Clinical Pharmacy     Patients diabetes is improving with most recent A1c of 6.8% (goal < 7 %) from 7.3% in August. Patient's SMBG have continued to average in the 130-160s; notes a few elevated readings however most readings are stable. Patient reported illness with vomiting and diarrhea. Patient has been tolerating Mounjaro 15 mg once weekly. Plan to continue with current dose of Mounjaro and advised patient to work on lifestyle changes as discussed to avoid additional medications. Patient to update A1c at next PCP visit, 6/10/2025.     Patient has continued to change up her diet and trying to limit greasy fried foods and sweets as she believes it is mainly her diet that is contributing to the elevated blood sugars. Counseled patient on being mindful of portion sizes. She has been tracking which foods lead to higher readings. Patient notes that due to being busy with cleaning out her garage she has not been on the treadmill since last visit. Encouraged patient to restart when she is able, even if it is only for a few times each week for short 20-30 minute sessions.      Compliance at present is estimated to be good. Efforts to improve compliance (if necessary) will be directed at dietary modifications: increasing consumption of vegetables and limiting carbs, as well as increasing physical activity.      Plan:  CONTINUE  Mounjaro 15 mg once weekly     Future Considerations:  Discuss revisiting low dose of Jardiance for renal protection and additional glycemic control    Aramis Ramsey, PharmD    Continue all meds under the continuation of care with  the referring provider and clinical pharmacy team.    Clinical Pharmacist follow up: 6/17/25 @ 2 PM or sooner as needed based on clinical intervention  Type of Encounter:  Telephone    Verbal consent to manage patient's drug therapy was obtained from the patient. They were informed they may decline to participate or withdraw from participation in pharmacy services at any time.

## 2025-05-06 ENCOUNTER — APPOINTMENT (OUTPATIENT)
Dept: PHARMACY | Facility: HOSPITAL | Age: 60
End: 2025-05-06
Payer: MEDICARE

## 2025-05-06 DIAGNOSIS — E11.9 TYPE 2 DIABETES MELLITUS WITHOUT COMPLICATION, WITHOUT LONG-TERM CURRENT USE OF INSULIN: ICD-10-CM

## 2025-05-07 DIAGNOSIS — R12 HEARTBURN: ICD-10-CM

## 2025-05-07 PROCEDURE — RXMED WILLOW AMBULATORY MEDICATION CHARGE

## 2025-05-07 RX ORDER — OMEPRAZOLE 20 MG/1
20 CAPSULE, DELAYED RELEASE ORAL DAILY
Qty: 30 CAPSULE | Refills: 1 | Status: SHIPPED | OUTPATIENT
Start: 2025-05-07 | End: 2025-07-06

## 2025-05-09 ENCOUNTER — PHARMACY VISIT (OUTPATIENT)
Dept: PHARMACY | Facility: CLINIC | Age: 60
End: 2025-05-09
Payer: COMMERCIAL

## 2025-06-04 PROCEDURE — RXMED WILLOW AMBULATORY MEDICATION CHARGE

## 2025-06-06 ENCOUNTER — PHARMACY VISIT (OUTPATIENT)
Dept: PHARMACY | Facility: CLINIC | Age: 60
End: 2025-06-06
Payer: COMMERCIAL

## 2025-06-10 ENCOUNTER — APPOINTMENT (OUTPATIENT)
Dept: PRIMARY CARE | Facility: CLINIC | Age: 60
End: 2025-06-10
Payer: MEDICARE

## 2025-06-10 VITALS
HEIGHT: 66 IN | BODY MASS INDEX: 27.31 KG/M2 | DIASTOLIC BLOOD PRESSURE: 70 MMHG | HEART RATE: 91 BPM | OXYGEN SATURATION: 96 % | WEIGHT: 169.9 LBS | SYSTOLIC BLOOD PRESSURE: 103 MMHG

## 2025-06-10 DIAGNOSIS — N18.31 CHRONIC KIDNEY DISEASE, STAGE 3A (MULTI): ICD-10-CM

## 2025-06-10 DIAGNOSIS — E11.9 TYPE 2 DIABETES MELLITUS WITHOUT COMPLICATION, WITHOUT LONG-TERM CURRENT USE OF INSULIN: Primary | ICD-10-CM

## 2025-06-10 DIAGNOSIS — E78.2 MIXED HYPERLIPIDEMIA: ICD-10-CM

## 2025-06-10 DIAGNOSIS — M25.511 RIGHT SHOULDER PAIN, UNSPECIFIED CHRONICITY: ICD-10-CM

## 2025-06-10 PROBLEM — S93.409A SPRAIN OF ANKLE: Status: ACTIVE | Noted: 2025-06-10

## 2025-06-10 LAB — POC HEMOGLOBIN A1C: 6.6 % (ref 4.2–6.5)

## 2025-06-10 PROCEDURE — 4010F ACE/ARB THERAPY RXD/TAKEN: CPT | Performed by: INTERNAL MEDICINE

## 2025-06-10 PROCEDURE — 99214 OFFICE O/P EST MOD 30 MIN: CPT | Performed by: INTERNAL MEDICINE

## 2025-06-10 PROCEDURE — 3074F SYST BP LT 130 MM HG: CPT | Performed by: INTERNAL MEDICINE

## 2025-06-10 PROCEDURE — 3008F BODY MASS INDEX DOCD: CPT | Performed by: INTERNAL MEDICINE

## 2025-06-10 PROCEDURE — 3044F HG A1C LEVEL LT 7.0%: CPT | Performed by: INTERNAL MEDICINE

## 2025-06-10 PROCEDURE — G2211 COMPLEX E/M VISIT ADD ON: HCPCS | Performed by: INTERNAL MEDICINE

## 2025-06-10 PROCEDURE — 3078F DIAST BP <80 MM HG: CPT | Performed by: INTERNAL MEDICINE

## 2025-06-10 PROCEDURE — 1036F TOBACCO NON-USER: CPT | Performed by: INTERNAL MEDICINE

## 2025-06-10 PROCEDURE — 83036 HEMOGLOBIN GLYCOSYLATED A1C: CPT | Performed by: INTERNAL MEDICINE

## 2025-06-10 ASSESSMENT — ENCOUNTER SYMPTOMS
UNEXPECTED WEIGHT CHANGE: 0
SHORTNESS OF BREATH: 0
PALPITATIONS: 0
ABDOMINAL PAIN: 0
FATIGUE: 0
CONSTIPATION: 0
WHEEZING: 0
ACTIVITY CHANGE: 0
COUGH: 0

## 2025-06-10 NOTE — PROGRESS NOTES
"Subjective   Patient ID: Binta Mccann is a 59 y.o. female who presents for Follow-up.    HPI   Mounjaro is at 15. Most of blood sugar numbers are normal.  No hypoglycemia  Medicine tolerated without issues.lost wt  Has monthly appointment with pharmacy  Had eye exam     Sees cardiologist regularly.  Thyroid is also followed by cardiology     GERD symptoms controlled with prn meds    On lyrica prn     Review of Systems   Constitutional:  Negative for activity change, fatigue and unexpected weight change.   HENT:  Negative for postnasal drip.    Respiratory:  Negative for cough, shortness of breath and wheezing.    Cardiovascular:  Negative for chest pain and palpitations.   Gastrointestinal:  Negative for abdominal pain and constipation.       Objective   /70   Pulse 91   Ht 1.676 m (5' 6\")   Wt 77.1 kg (169 lb 14.4 oz)   SpO2 96%   BMI 27.42 kg/m²     Physical Exam    Constitutional:       Appearance: Normal appearance.   HENT:      Head: Normocephalic.   Cardiovascular:      Rate and Rhythm: Normal rate and regular rhythm.   Pulmonary:      Effort: Pulmonary effort is normal.      Breath sounds: Normal breath sounds.   Abdominal:      General: Abdomen is flat. There is no distension.      Palpations: There is no mass.      Tenderness: There is no abdominal tenderness. There is no guarding or rebound.   Musculoskeletal:      Cervical back: Neck supple.   Neurological:      Mental Status: She is alert.    Assessment/Plan   Problem List Items Addressed This Visit           ICD-10-CM    Diabetes mellitus type 2, uncomplicated - Primary E11.9    Relevant Orders    POCT glycosylated hemoglobin (Hb A1C) manually resulted (Completed)    Hyperlipidemia E78.5    Chronic kidney disease, stage 3a (Multi) N18.31    Right shoulder pain M25.511     Diabetes control much better.  Continue same treatment plan  Increase activity after meals which could help lower blood sugar  No change in medications  Continue " statin  See cardiology as scheduled.  If BP is lower at home consider decreasing losartan    Shoulder pain was better after therapy.  Pain is coming back.  Shoulder exercises shown to patient to work at home    Kidney function stable.     Schedule wellness exam

## 2025-06-12 NOTE — PROGRESS NOTES
"  Clinical Pharmacy Appointment    Patient ID: Binta Mccann is a 59 y.o. female who presents for Diabetes.    Referring Provider: Misti Contreras MD     Subjective   HPI  Known diabetes complications include: none   Endocrinology provider? No  Special needs/barriers to therapy: none     Lifestyle Changes:  Current diet: in general, a \"healthy\" diet  , well balanced; eats lots of meat and limited amount of vegetables  Current exercise: walking on treadmill 1-2x/week for 20-30 minutes each session  Notes she will increase physical activity once the weather warms up    Glucose Monitoring Regimen:  Patient is using glucometer; Truemetrix    Reported SMBG Measurements:  On average, between 130-150s  FBG: few 180-190s  FB, 126, 141, 125, 133, 163  PPB, 180, 165, 170, 155, 153, 166, 165, 155    Any episodes of hypoglycemia? no    Adverse Effects:   Patient denies any GI adverse effects (Upset stomach/diarrhea/constipation/nausea/vomiting)    Adherence: Takes medication as directed and reports no missed doses   Affordability/Accessibility: no issues    Diabetes Pharmacotherapy:    Mounjaro 15 mg once weekly    Historical Diabetes Pharmacotherapy:  Metformin XR -- renal function  Trulicity -- nausea/vomiting   Jardiance -- dehydrated all the time  Januvia -- not bringing down her A1c  Rybelsus -- intolerance     Secondary Prevention  Statin? Yes (Rosuvastatin 40 mg)  ACE-I/ARB? Yes (Losartan 50 mg)  Aspirin? No    Pertinent PMH Review  PMH of Pancreatitis: No  PMH of Retinopathy: No  PMH of Recurrent Urinary Tract Infections: No  PMH of Medullary Thyroid Cancer (MTC): No  PMH of Multiple Endocrine Neoplasia (MEN) Type II: No    Health Maintenance:   Foot Exam: checks feet daily  Eye Exam: last done   Lipid Panel: LDL 41 mg/dL (2024)   UACR: 9.1 micrograms/mg (10/2024)    Drug Interactions: No significant drug-drug interactions exist that require change in therapy.    Objective     There were no vitals " "taken for this visit.     Labs  Lab Results   Component Value Date    CALCIUM 9.4 02/24/2025    CO2 25 02/24/2025     02/24/2025    CREATININE 1.39 (H) 02/24/2025    GLUCOSE 149 (H) 02/24/2025    K 4.4 02/24/2025     02/24/2025    BUN 27 (H) 02/24/2025    ANIONGAP 14 09/30/2024    PHOS 2.9 02/24/2025    ALBUMIN 4.3 02/24/2025     No results found for: \"TRIG\", \"CHOL\", \"LDLCALC\", \"HDL\"  Lab Results   Component Value Date    HGBA1C 6.6 (A) 06/10/2025       Current Outpatient Medications on File Prior to Visit   Medication Sig Dispense Refill    alcohol swabs pads, medicated Use to test blood sugars once daily. 100 each 11    blood sugar diagnostic (Blood Glucose Test) strip Use to test blood sugars once daily. 100 strip 11    blood-glucose meter misc Use to test blood sugars once daily. 1 each 0    clotrimazole-betamethasone (Lotrisone) cream Apply topically twice a day.      Dexcom G7  misc Use as instructed 1 each 0    Dexcom G7 Sensor device Change every 10 days 3 each 3    diclofenac sodium (Voltaren) 1 % gel Apply half inch to both fingers 4 times a jimbo 100 g 0    fenofibrate (Tricor) 145 mg tablet Take 1 tablet (145 mg) by mouth once daily.      L. acidophilus/Bifid. animalis 32 billion cell capsule Take by mouth.      lancets misc Use to test blood sugars once daily. 100 each 11    levothyroxine (Synthroid, Levoxyl) 125 mcg tablet Take 1 tablet (125 mcg) by mouth once daily in the morning. Take before meals.      losartan (Cozaar) 50 mg tablet Take 1 tablet (50 mg) by mouth once daily.      metoprolol succinate XL (Toprol-XL) 25 mg 24 hr tablet Take 1 tablet (25 mg) by mouth once daily.      omeprazole (PriLOSEC) 20 mg DR capsule Take 1 capsule (20 mg) by mouth once daily. 30 capsule 1    pregabalin (Lyrica) 100 mg capsule Take 1 capsule (100 mg) by mouth.      rosuvastatin (Crestor) 40 mg tablet Take 1 tablet (40 mg) by mouth once daily.      tirzepatide (Mounjaro) 15 mg/0.5 mL pen injector " Inject 15 mg under the skin 1 (one) time per week. 2 mL 11    triamcinolone (Kenalog) 0.1 % cream Apply topically 2 times a day.       No current facility-administered medications on file prior to visit.        Assessment/Plan   Problem List Items Addressed This Visit           ICD-10-CM    Diabetes mellitus type 2, uncomplicated E11.9     Patients diabetes is controlled with most recent A1c of 6.6% (goal < 7 %). Patient's SMBG have continued to average in the 110-140s; notes a few elevated readings however most readings are at goal. Patient reported illness with vomiting and diarrhea. Patient has been tolerating Mounjaro 15 mg once weekly. Plan to continue with current dose of Mounjaro and advised patient to work on lifestyle changes as discussed to avoid additional medications.      Patient has continued to change up her diet and trying to limit greasy fried foods and sweets as she believes it is mainly her diet that is contributing to the elevated blood sugars. Counseled patient on being mindful of portion sizes. She has been tracking which foods lead to higher readings.  Encouraged patient to restart when she is able, even if it is only for a few times each week for short 20-30 minute sessions. Exercise in pool during the summer.     Compliance at present is estimated to be good. Efforts to improve compliance (if necessary) will be directed at dietary modifications: increasing consumption of vegetables and limiting carbs, as well as increasing physical activity.      Plan:  CONTINUE  Mounjaro 15 mg once weekly       Aramis Ramsey, PharmD    Continue all meds under the continuation of care with the referring provider and clinical pharmacy team.    Clinical Pharmacist follow up: 11/4/25 @ 2 PM or sooner as needed based on clinical intervention    Verbal consent to manage patient's drug therapy was obtained from the patient. They were informed they may decline to participate or withdraw from participation in  pharmacy services at any time.

## 2025-06-17 ENCOUNTER — APPOINTMENT (OUTPATIENT)
Dept: PHARMACY | Facility: HOSPITAL | Age: 60
End: 2025-06-17
Payer: MEDICARE

## 2025-06-17 DIAGNOSIS — E11.9 TYPE 2 DIABETES MELLITUS WITHOUT COMPLICATION, WITHOUT LONG-TERM CURRENT USE OF INSULIN: ICD-10-CM

## 2025-07-01 PROCEDURE — RXMED WILLOW AMBULATORY MEDICATION CHARGE

## 2025-07-03 ENCOUNTER — PHARMACY VISIT (OUTPATIENT)
Dept: PHARMACY | Facility: CLINIC | Age: 60
End: 2025-07-03
Payer: COMMERCIAL

## 2025-07-30 PROCEDURE — RXMED WILLOW AMBULATORY MEDICATION CHARGE

## 2025-08-03 DIAGNOSIS — E11.9 TYPE 2 DIABETES MELLITUS WITHOUT COMPLICATION, WITHOUT LONG-TERM CURRENT USE OF INSULIN: ICD-10-CM

## 2025-08-03 RX ORDER — LANCETS
EACH MISCELLANEOUS
Qty: 100 EACH | Refills: 11 | Status: CANCELLED | OUTPATIENT
Start: 2025-08-03

## 2025-08-03 RX ORDER — CALCIUM CITRATE/VITAMIN D3 200MG-6.25
TABLET ORAL
Qty: 100 STRIP | Refills: 11 | Status: CANCELLED | OUTPATIENT
Start: 2025-08-03

## 2025-08-04 ENCOUNTER — PHARMACY VISIT (OUTPATIENT)
Dept: PHARMACY | Facility: CLINIC | Age: 60
End: 2025-08-04
Payer: COMMERCIAL

## 2025-08-04 DIAGNOSIS — E11.9 TYPE 2 DIABETES MELLITUS WITHOUT COMPLICATION, WITHOUT LONG-TERM CURRENT USE OF INSULIN: ICD-10-CM

## 2025-08-04 PROCEDURE — RXMED WILLOW AMBULATORY MEDICATION CHARGE

## 2025-08-04 RX ORDER — LANCETS
EACH MISCELLANEOUS
Qty: 100 EACH | Refills: 11 | Status: SHIPPED | OUTPATIENT
Start: 2025-08-04

## 2025-08-04 RX ORDER — CALCIUM CITRATE/VITAMIN D3 200MG-6.25
TABLET ORAL
Qty: 100 STRIP | Refills: 3 | Status: SHIPPED | OUTPATIENT
Start: 2025-08-04

## 2025-08-06 ENCOUNTER — PHARMACY VISIT (OUTPATIENT)
Dept: PHARMACY | Facility: CLINIC | Age: 60
End: 2025-08-06
Payer: COMMERCIAL

## 2025-08-07 ENCOUNTER — PHARMACY VISIT (OUTPATIENT)
Dept: PHARMACY | Facility: CLINIC | Age: 60
End: 2025-08-07

## 2025-08-25 PROCEDURE — RXMED WILLOW AMBULATORY MEDICATION CHARGE

## 2025-08-27 ENCOUNTER — PHARMACY VISIT (OUTPATIENT)
Dept: PHARMACY | Facility: CLINIC | Age: 60
End: 2025-08-27
Payer: COMMERCIAL

## 2025-10-14 ENCOUNTER — APPOINTMENT (OUTPATIENT)
Dept: PRIMARY CARE | Facility: CLINIC | Age: 60
End: 2025-10-14
Payer: MEDICARE

## 2025-11-04 ENCOUNTER — APPOINTMENT (OUTPATIENT)
Dept: PHARMACY | Facility: HOSPITAL | Age: 60
End: 2025-11-04
Payer: MEDICARE

## 2025-11-05 ENCOUNTER — APPOINTMENT (OUTPATIENT)
Dept: PRIMARY CARE | Facility: CLINIC | Age: 60
End: 2025-11-05
Payer: MEDICARE